# Patient Record
Sex: FEMALE | Race: BLACK OR AFRICAN AMERICAN | URBAN - METROPOLITAN AREA
[De-identification: names, ages, dates, MRNs, and addresses within clinical notes are randomized per-mention and may not be internally consistent; named-entity substitution may affect disease eponyms.]

---

## 2017-01-24 DIAGNOSIS — K21.9 GASTROESOPHAGEAL REFLUX DISEASE WITHOUT ESOPHAGITIS: ICD-10-CM

## 2017-01-25 RX ORDER — OMEPRAZOLE 40 MG/1
CAPSULE, DELAYED RELEASE ORAL
Qty: 90 CAP | Refills: 1 | Status: SHIPPED | OUTPATIENT
Start: 2017-01-25 | End: 2017-09-27 | Stop reason: SDUPTHER

## 2017-01-25 NOTE — TELEPHONE ENCOUNTER
Her [facility] records show this is discontinued. Can we verify again what she is on and if she is on anything for her stomach.

## 2017-01-25 NOTE — TELEPHONE ENCOUNTER
Her daughter returned called and states that she is back on medication the hospital put her back on it and gabapentin

## 2017-02-24 RX ORDER — NIFEDIPINE 90 MG/1
90 TABLET, FILM COATED, EXTENDED RELEASE ORAL DAILY
Qty: 90 TAB | Refills: 1 | Status: SHIPPED | OUTPATIENT
Start: 2017-02-24 | End: 2017-09-05 | Stop reason: SDUPTHER

## 2017-03-22 DIAGNOSIS — J45.30 MILD PERSISTENT ASTHMA WITHOUT COMPLICATION: ICD-10-CM

## 2017-03-24 RX ORDER — ALBUTEROL SULFATE 90 UG/1
2 AEROSOL, METERED RESPIRATORY (INHALATION)
Qty: 3 INHALER | Refills: 1 | Status: SHIPPED | OUTPATIENT
Start: 2017-03-24 | End: 2019-09-22

## 2017-05-02 ENCOUNTER — PATIENT OUTREACH (OUTPATIENT)
Dept: INTERNAL MEDICINE CLINIC | Age: 82
End: 2017-05-02

## 2017-05-02 NOTE — PROGRESS NOTES
Attempted to contact [facility] and [facility] for [place].  confirmed that Mrs. Nayla Bey is a current inpatient but there is not a nurse available at this time to conduct med-rec. Will attempt another call tomorrow.

## 2017-05-03 ENCOUNTER — PATIENT OUTREACH (OUTPATIENT)
Dept: INTERNAL MEDICINE CLINIC | Age: 82
End: 2017-05-03

## 2017-05-03 RX ORDER — ACETAMINOPHEN 500 MG
TABLET ORAL
COMMUNITY

## 2017-05-03 RX ORDER — CEPHALEXIN 500 MG/1
500 CAPSULE ORAL 2 TIMES DAILY
COMMUNITY
Start: 2017-05-02 | End: 2017-05-06

## 2017-05-03 RX ORDER — INSULIN GLARGINE 100 [IU]/ML
4 INJECTION, SOLUTION SUBCUTANEOUS DAILY
COMMUNITY

## 2017-05-03 RX ORDER — OXYCODONE HYDROCHLORIDE 5 MG/1
2.5 TABLET ORAL
COMMUNITY
End: 2017-09-27

## 2017-05-03 NOTE — PROGRESS NOTES
[address] [facility] Follow Up for [facility] Admission from 4/25/17 - 5/1/2017 for UTI, CKD. RRAT score: 28. High    Medical History:     Past Medical History:   Diagnosis Date    Asthma     Hypertension          This represents Transitions of Care b/c NN spoke with patient and/or caregiver within 2 business days of discharge. Mrs. Tremayne Mccloud is currently inpatient at [facility] and [address] for an unknown duration. Med-rec completed with staff nurse at that facility. Duration of stay unknown at this time. Course of current Hospitalization (referenced by [facility] EMR discharge summaries note dated 5/1/17):   ----------------------------------------------------------------------  \"Discharge Diagnosis:  UTI  - C&S + for E.coli resistant to flouroquinolones  - empiric Levaquin to cefazolin.  Known PCN allergy of hives, monitor for reaction, tolerating.     - cefazolin to keflex at [facility] through 5.6.17. Hypokalemia  - replete  - trend labs  - resolved    CKD 4  - trend labs  - near baseline  - stopped tramadol 2/2 renal function and Cr improved to 1.7 at [facility].  Use APAP and low dose narcotics if needed.      General weakness/functional decline  - seen by PT and will need SNF at [facility]    Left knee pain  - 3 view xray shows no acute osteo abnormality   - pain control    HTN    - nifedipine, coreg, imdur    Acute on chronic renal failure  - Improved with IV fluids and treatment of underlying infection  - trend labs  - at baseline of CKD4    COPD  - continue home meds/supportive care  - advair, HHN    Chronic diastolic heart failure  - Continue home lasix    Chronic anemia  - iron profile with normocytic anemia. - trend stable, no overt signs of bleeding.      Constipation  - cont bowel protocol  - added dulcolax supp.     DVT ppx  - heparin     Hospital Course:  Patient is an 81 yo female with pmhx of HTN, COPD, diastolic HF presents to ED from home with gen weakness found to have UTI.  UA C&S with E.coli resistant to flouroquinolones.  levaquin [facility], she [facility] course 5.6.17.  Patient with gen weakness from deconditioning and UTI and was seen by PT.  [facility] and patient has agreed to SNF.  She was noted to have left knee pain which was POA, xray showed no acute abnormality.  Cont pain control and PT.  Her other chronic comorbidities were monitored while admitted and she remained at or near baseline.  She is now stable for DC to SNF and to follow up with her family doctor in 1 week of DC.    ----------------------------------------------------------------------   Medication Reconciliation completed: yes. New medications at discharge include:  START taking these medications      Details    acetaminophen (TYLENOL) 500 mg PO TABS  Take 1 Tab by Mouth Every 6 Hours As Needed for Pain or Fever.        oxyCODONE (ROXICODONE) 5 mg PO TABS  Take 0.5 Tabs by Mouth Every 8 Hours As Needed for Pain. Qty: 5 Tab, Refills: 0        cephALEXin (KEFLEX) 500 mg PO CAPS  Take 1 Cap by Mouth Twice Daily for 5 days.            CONTINUE these medications which have CHANGED      Details    insulin LISPRO (HUMALOG VIAL) 100 unit/mL SC SOLN  Inject 1-6 Units beneath the skin 3 (three) times daily before meals.               STOP taking these medications          traMADol (ULTRAM) 50 mg PO TABS  Comments:    Reason for Stopping:  renal function                 Prescription Medication total: 14. (pharmacy consult for polypharm needed?) not at this time but may be necessary for polypharmacy in the future.

## 2017-05-09 ENCOUNTER — PATIENT OUTREACH (OUTPATIENT)
Dept: INTERNAL MEDICINE CLINIC | Age: 82
End: 2017-05-09

## 2017-05-09 NOTE — PROGRESS NOTES
[address] [facility] and [address] for status update. Mrs. Fabian Fulton is currently an inpatient in this facility. Spoke with staff nurse who states that family is insisting that Mrs. Gonzales return to [facility].  She is likely to be discharged from [facility] and [address] to [facility] today. Will follow [facility] for disposition.

## 2017-05-11 ENCOUNTER — PATIENT OUTREACH (OUTPATIENT)
Dept: INTERNAL MEDICINE CLINIC | Age: 82
End: 2017-05-11

## 2017-05-11 NOTE — PROGRESS NOTES
Patient was sent to ED from [facility] and [address] on 5/9/17 along with her  who was also at the same facility. Mrs. Gonzales was not directly admitted but was held in the ED overnight as Mrs. Nasim Pleitez, her daughter, did not want her to return to [facility] and [address]. She was held overnight pending acceptance at another SNF. When it appeared that Mrs. Ortega Louie would need to stay a second night awaiting a PT evaluation and referral to SNF, Mrs. Nasim Pleitez decided to take her home instead. At this time, Mr. Ortega Louie is hospitalized and Mrs. Nasim Pleitez would be able to take care of her mother. Attempted to contact Mrs. Nasim Pleitez for follow up. Her number rang to a recording stating that the person being called could not take the call- and there was no voice mail option. Will attempt another call tomorrow.

## 2017-05-12 ENCOUNTER — PATIENT OUTREACH (OUTPATIENT)
Dept: INTERNAL MEDICINE CLINIC | Age: 82
End: 2017-05-12

## 2017-05-12 NOTE — PROGRESS NOTES
Patient was sent to ED from [facility] [address] on 5/9/17 along with her  who was also at the same facility. Mrs. Gonzales was not directly admitted but was held in the ED overnight as Mrs. Wilfrid Mcrae, her daughter, did not want her to return to [facility] [address]. She was held overnight pending acceptance at another SNF. When it appeared that Mrs. Misty Cronin would need to stay a second night awaiting a PT evaluation and referral to SNF, Mrs. Wilfrid Mcrae decided to take her home instead. At this time, Mr. Misty Cronin is hospitalized and Mrs. Wilfrid Mcrae would be able to take care of her mother. Second attempt to contact Mrs. Wilfrid Mcrae for follow up. Her number rang to a recording stating that the person being called could not take the call- and there was no voice mail option.

## 2017-06-09 ENCOUNTER — PATIENT OUTREACH (OUTPATIENT)
Dept: FAMILY MEDICINE CLINIC | Age: 82
End: 2017-06-09

## 2017-06-09 NOTE — Clinical Note
FYI: I am  Covering today . This patient's daughter ( Mrs Hetal Lauren )called asking for this patient's current medication list for [facility]. Patient has not been seen since 5/19/2016. I sent last office note with those medications . They may not be accurate because patient has been hospitalized x 2 since that office visit.  Thanks

## 2017-06-09 NOTE — PROGRESS NOTES
Daughter Mrs Thomas Javier called asking for NN to have Dr Erin Rojas office fax over  to [facility] her mother's updated medication list. Last office visit notes were from 5/19/2016. The medication list may not be accurate because patient has had 2 hospital admissions since then with a stay in a SNF. Did send the list with the office note to [facility] Fax # [phone] as directed. .  Daughter called back again asking for current HGBA1C last one done in [facility] [place] [address] again in 5/2016. State's she will call Emily at the endocrinology office.

## 2017-07-26 DIAGNOSIS — I10 ESSENTIAL HYPERTENSION, BENIGN: ICD-10-CM

## 2017-07-26 RX ORDER — ISOSORBIDE MONONITRATE 30 MG/1
30 TABLET, EXTENDED RELEASE ORAL DAILY
Qty: 90 TAB | Refills: 1 | Status: SHIPPED | OUTPATIENT
Start: 2017-07-26 | End: 2018-08-31 | Stop reason: SDUPTHER

## 2017-09-27 ENCOUNTER — OFFICE VISIT (OUTPATIENT)
Dept: INTERNAL MEDICINE CLINIC | Age: 82
End: 2017-09-27

## 2017-09-27 VITALS
OXYGEN SATURATION: 97 % | HEIGHT: 59 IN | BODY MASS INDEX: 26 KG/M2 | DIASTOLIC BLOOD PRESSURE: 70 MMHG | HEART RATE: 68 BPM | RESPIRATION RATE: 16 BRPM | TEMPERATURE: 98.6 F | SYSTOLIC BLOOD PRESSURE: 150 MMHG | WEIGHT: 129 LBS

## 2017-09-27 DIAGNOSIS — N18.30 CHRONIC KIDNEY DISEASE (CKD), STAGE III (MODERATE) (HCC): ICD-10-CM

## 2017-09-27 DIAGNOSIS — K21.9 GASTROESOPHAGEAL REFLUX DISEASE WITHOUT ESOPHAGITIS: ICD-10-CM

## 2017-09-27 DIAGNOSIS — G62.9 NEUROPATHY: ICD-10-CM

## 2017-09-27 DIAGNOSIS — Z23 ENCOUNTER FOR IMMUNIZATION: ICD-10-CM

## 2017-09-27 DIAGNOSIS — D64.9 ANEMIA, UNSPECIFIED TYPE: ICD-10-CM

## 2017-09-27 DIAGNOSIS — Z00.00 MEDICARE ANNUAL WELLNESS VISIT, SUBSEQUENT: ICD-10-CM

## 2017-09-27 DIAGNOSIS — J45.20 MILD INTERMITTENT ASTHMA WITHOUT COMPLICATION: ICD-10-CM

## 2017-09-27 DIAGNOSIS — E11.10 TYPE 2 DIABETES MELLITUS WITH KETOACIDOSIS WITHOUT COMA, WITH LONG-TERM CURRENT USE OF INSULIN (HCC): Primary | ICD-10-CM

## 2017-09-27 DIAGNOSIS — Z79.4 TYPE 2 DIABETES MELLITUS WITH KETOACIDOSIS WITHOUT COMA, WITH LONG-TERM CURRENT USE OF INSULIN (HCC): Primary | ICD-10-CM

## 2017-09-27 RX ORDER — IPRATROPIUM BROMIDE AND ALBUTEROL SULFATE 2.5; .5 MG/3ML; MG/3ML
3 SOLUTION RESPIRATORY (INHALATION) 4 TIMES DAILY
Qty: 30 NEBULE | Refills: 3 | Status: SHIPPED | OUTPATIENT
Start: 2017-09-27 | End: 2019-11-19 | Stop reason: SDUPTHER

## 2017-09-27 RX ORDER — OMEPRAZOLE 40 MG/1
CAPSULE, DELAYED RELEASE ORAL
Qty: 90 CAP | Refills: 1 | Status: SHIPPED | OUTPATIENT
Start: 2017-09-27 | End: 2018-06-30 | Stop reason: SDUPTHER

## 2017-09-27 RX ORDER — GABAPENTIN 100 MG/1
CAPSULE ORAL
Qty: 90 CAP | Refills: 1 | Status: SHIPPED | OUTPATIENT
Start: 2017-09-27 | End: 2018-04-03 | Stop reason: SDUPTHER

## 2017-09-27 RX ORDER — NIFEDIPINE 90 MG/1
TABLET, FILM COATED, EXTENDED RELEASE ORAL
Qty: 90 TAB | Refills: 1 | Status: SHIPPED | OUTPATIENT
Start: 2017-09-27 | End: 2018-06-29 | Stop reason: SDUPTHER

## 2017-09-27 NOTE — PROGRESS NOTES
Chief Complaint   Patient presents with    Annual Wellness Visit    Diabetes    Hypertension     1. Have you been to the ER, urgent care clinic since your last visit? Hospitalized since your last visit? Yes Where: [facility]    2. Have you seen or consulted any other health care providers outside of the [address] [place] [address] Jeremy since your last visit? Include any pap smears or colon screening.  Yes Where: endo and cardiology    ADL Assessment 9/27/2017   Feeding yourself Help Needed   Getting from bed to chair Help Needed   Getting dressed No Help Needed   Bathing or showering Help Needed   Walk across the room (includes cane/walker) Help Needed   Using the telphone No Help Needed   Taking your medications Help Needed   Preparing meals Help Needed   Managing money (expenses/bills) Help Needed   Moderately strenuous housework (laundry) Help Needed   Shopping for personal items (toiletries/medicines) Help Needed   Shopping for groceries Help Needed   Driving Help Needed   Climbing a flight of stairs Help Needed   Getting to places beyond walking distances Help Needed     Lab slip printed and given to pt to get labs done at lab

## 2017-09-27 NOTE — PROGRESS NOTES
HISTORY OF PRESENT ILLNESS  Chery Wilder is a 80 y.o. female. HPI Ms. Trina Tucker is here for follow up on Diabetes and medication. She has been hospitalized several times for reasons from HTN, blood sugar issues, hypothermia and a UTI. Her  passed away several months ago. She has not lately seen cardiology, nephrology and endocrinology. Advised her and her daughter that it is important to follow up with all the specialists due to serous underlying health issues. Her daughter states she has actually been doing well since she got out of the hospital several months ago. When going through her medications, she is taking lasix but not potassium. She states she thinks the hospital took her off it. Will see how her potassium is when she gets her labs done. They were unable to get blood here in the office. Review of Systems   Constitutional: Positive for chills (c/o feeling cold all the time - was anemic when in the hospital) and malaise/fatigue. Respiratory: Negative for cough and shortness of breath. Cardiovascular: Positive for leg swelling (mild). Negative for chest pain. Gastrointestinal: Negative for heartburn (takes prilosec), nausea and vomiting. Neurological: Negative for dizziness. Physical Exam   Constitutional: She is oriented to person, place, and time. She appears well-developed and well-nourished. No distress. HENT:   Head: Normocephalic and atraumatic. Cardiovascular: Normal rate and regular rhythm. Pulmonary/Chest: Effort normal.   Musculoskeletal: She exhibits edema (+1). Neurological: She is alert and oriented to person, place, and time.      Visit Vitals    /70 (BP 1 Location: Left arm, BP Patient Position: Sitting)    Pulse 68    Temp 98.6 °F (37 °C) (Oral)    Resp 16    Ht 4' 11\" (1.499 m)    Wt 129 lb (58.5 kg)    SpO2 97%    BMI 26.05 kg/m2      Wt Readings from Last 3 Encounters:   09/27/17 129 lb (58.5 kg)   05/19/16 156 lb (70.8 kg)   05/04/16 156 lb (70.8 kg)     Diabetic foot exam:     Left: Filament test reduced sensation with micro filament   Pulse DP: 1+ (weak)   Pulse PT: 1+ (weak)   Deformities: None  Right: Filament test reduced sensation with micro filament   Pulse DP: 1+ (weak)   Pulse PT: 1+ (weak)   Deformities: None          ASSESSMENT and PLAN    ICD-10-CM ICD-9-CM    1. Type 2 diabetes mellitus with ketoacidosis without coma, with long-term current use of insulin (HCC) E13.10 250.10 LA COLLECTION VENOUS BLOOD,VENIPUNCTURE    X11.6 L17.58 METABOLIC PANEL, COMPREHENSIVE      LIPID PANEL      HEMOGLOBIN A1C WITH EAG      MICROALBUMIN, UR, RAND W/ MICROALBUMIN/CREA RATIO       DIABETES FOOT EXAM      TSH 3RD GENERATION   2. Neuropathy (HCC) G62.9 355.9 gabapentin (NEURONTIN) 100 mg capsule   3. Chronic kidney disease (CKD), stage III (moderate) N18.3 585.3    4. Anemia, unspecified type D64.9 285.9 CBC WITH AUTOMATED DIFF      IRON PROFILE   5. Mild intermittent asthma without complication I06.55 658.47 albuterol-ipratropium (DUO-NEB) 2.5 mg-0.5 mg/3 ml nebu   6. Gastroesophageal reflux disease without esophagitis K21.9 530.81 omeprazole (PRILOSEC) 40 mg capsule     Pt verbalized understanding of their condition and diagnoses, treatment plan,  as well as side effects of any new medications prescribed.

## 2017-09-27 NOTE — PROGRESS NOTES
Juan Chong is a 80 y.o. female and presents for annual Medicare Wellness Visit. Problem List: Reviewed with patient and discussed risk factors. Patient Active Problem List   Diagnosis Code    Essential hypertension, benign I10    Asthma J45.909    Diabetes mellitus ([facility] [address] E11.9    Chronic kidney disease (CKD), stage III (moderate) N18.3    Advanced care planning/counseling discussion L92.16    Diastolic heart failure (HCC) I50.30       Current medical providers:  Patient Care Team:  David Ruggiero PA-C as PCP - General (Family Practice)  Meenu Beltran RN as Ambulatory Care Navigator    PSH: Reviewed with patient  Past Surgical History:   Procedure Laterality Date    HX HYSTERECTOMY      HX TUMOR REMOVAL      arm        SH: Reviewed with patient  Social History   Substance Use Topics    Smoking status: Never Smoker    Smokeless tobacco: Never Used    Alcohol use No       FH: Reviewed with patient  Family History   Problem Relation Age of Onset    Stroke Father     Diabetes Maternal Grandmother     Asthma Maternal Grandmother        Medications/Allergies: Reviewed with patient  Current Outpatient Prescriptions on File Prior to Visit   Medication Sig Dispense Refill    isosorbide mononitrate ER (IMDUR) 30 mg tablet Take 1 Tab by mouth daily. 90 Tab 1    acetaminophen (TYLENOL) 500 mg tablet Take  by mouth every six (6) hours as needed for Pain.  insulin glargine (LANTUS SOLOSTAR) 100 unit/mL (3 mL) pen 4 Units by SubCUTAneous route daily.  albuterol (PROVENTIL HFA, VENTOLIN HFA, PROAIR HFA) 90 mcg/actuation inhaler Take 2 Puffs by inhalation every four (4) hours as needed for Wheezing. 3 Inhaler 1    insulin aspart (NOVOLOG) 100 unit/mL inpn by SubCUTAneous route. Sliding scale per endocrinology orders   Indications: type 2 diabetes mellitus      docusate sodium (COLACE) 100 mg capsule Take 1 Cap by mouth daily.  90 Cap 1    polyethylene glycol (LAXA CLEAR) 17 gram/dose powder Take 17 g by mouth daily. 510 g 3    ferrous sulfate 325 mg (65 mg iron) tablet Take 1 Tab by mouth Daily (before breakfast). 90 Tab 1    ergocalciferol (ERGOCALCIFEROL) 50,000 unit capsule Take 1 Cap by mouth every seven (7) days. Take on Sunday 12 Cap 1    cloNIDine (CATAPRES) 0.3 mg/24 hr apply 1 patch every week 12 Patch 1    hydrALAZINE (APRESOLINE) 100 mg tablet Take 1 Tab by mouth three (3) times daily. (Patient taking differently: Take 100 mg by mouth two (2) times a day.) 270 Tab 1    furosemide (LASIX) 40 mg tablet take 1 tablet by mouth daily (Patient taking differently: take 1/2 in the morning, and 1/2 in the afternoon) 90 Tab 1    carvedilol (COREG) 12.5 mg tablet Take 1 Tab by mouth two (2) times daily (with meals). (Patient taking differently: Take 25 mg by mouth two (2) times daily (with meals). ) 180 Tab 1    Insulin Needles, Disposable, (BD INSULIN PEN NEEDLE UF SHORT) 31 gauge x 5/16\" ndle Use for bid insulin injections 1 Package 11    Insulin Syringes, Disposable, 1 mL syrg Use for bid insulin administration 100 Syringe prn    ONETOUCH ULTRA TEST strip       multivitamin (ONE A DAY) tablet Take 1 Tab by mouth daily. 90 Each 1    Nebulizer & Compressor machine Use with albuterol solution for chest congestion 1 Each 0    Walker misc Pneumonia, weakness 1 Each 0    aspirin delayed-release 81 mg tablet Take  by mouth daily.  ipratropium (ATROVENT) 0.02 % nebulizer solution 2.5 mL by Nebulization route as needed for Wheezing. 300 mL 5    insulin NPH/insulin regular (NOVOLIN 70/30, HUMULIN 70/30) 100 unit/mL (70-30) injection Inject 25 units in the morning beneath the skin. (Patient taking differently: 10 Units by SubCUTAneous route two (2) times a day. Inject 25 units in the morning beneath the skin.) 10 mL 3     No current facility-administered medications on file prior to visit.        Allergies   Allergen Reactions    Ace Inhibitors Angioedema    Pcn [Penicillins] Other (comments)       Objective:  Visit Vitals    /70 (BP 1 Location: Left arm, BP Patient Position: Sitting)    Pulse 68    Temp 98.6 °F (37 °C) (Oral)    Resp 16    Ht 4' 11\" (1.499 m)    Wt 129 lb (58.5 kg)    SpO2 97%    BMI 26.05 kg/m2    Body mass index is 26.05 kg/(m^2). Assessment of cognitive impairment: Alert and oriented x 3    Depression Screen:   PHQ over the last two weeks 9/27/2017   Little interest or pleasure in doing things Not at all   Feeling down, depressed or hopeless Not at all   Total Score PHQ 2 0       Fall Risk Assessment:    Fall Risk Assessment, last 12 mths 9/27/2017   Able to walk? Yes   Fall in past 12 months? No   Fall with injury? -   Number of falls in past 12 months -   Fall Risk Score -       Functional Ability:   Does the patient exhibit a steady gait? Yes - with a walker/cane   How long did it take the patient to get up and walk from a sitting position? 10-20 seconds   Is the patient self reliant?  (ie can do own laundry, meals, household chores)  no     Does the patient handle his/her own medications?  no     Does the patient handle his/her own money? no     Is the patients home safe (ie good lighting, handrails on stairs and bath, etc.)? yes     Did you notice or did patient express any hearing difficulties? no     Did you notice or did patient express any vision difficulties?   declined     Were distance and reading eye charts used? N/A       Advance Care Planning:   Patient was offered the opportunity to discuss advance care planning:  yes     Does patient have an Advance Directive:  Yes. Pts daughter states she has one   If no, did you provide information on Caring Connections?   No - pt reports having one       Plan:      Orders Placed This Encounter    INFLUENZA VIRUS VACCINE, HIGH DOSE SEASONAL, PRESERVATIVE FREE    METABOLIC PANEL, COMPREHENSIVE    LIPID PANEL    HEMOGLOBIN A1C WITH EAG    MICROALBUMIN, UR, RAND W/ MICROALBUMIN/CREA RATIO    TSH 3RD GENERATION    CBC WITH AUTOMATED DIFF    IRON PROFILE    HM DIABETES FOOT EXAM    OK COLLECTION VENOUS BLOOD,VENIPUNCTURE    gabapentin (NEURONTIN) 100 mg capsule    albuterol-ipratropium (DUO-NEB) 2.5 mg-0.5 mg/3 ml nebu    NIFEdipine ER (ADALAT CC) 90 mg ER tablet    omeprazole (PRILOSEC) 40 mg capsule       Health Maintenance   Topic Date Due    MICROALBUMIN Q1  03/10/2016    LIPID PANEL Q1  03/19/2016    HEMOGLOBIN A1C Q6M  06/30/2016    EYE EXAM RETINAL OR DILATED Q1  06/09/2018    FOOT EXAM Q1  09/27/2018    MEDICARE YEARLY EXAM  09/28/2018    GLAUCOMA SCREENING Q2Y  06/09/2019    Pneumococcal 65+ Low/Medium Risk (2 of 2 - PPSV23) 09/09/2020    DTaP/Tdap/Td series (2 - Td) 05/19/2026    OSTEOPOROSIS SCREENING (DEXA)  Completed    ZOSTER VACCINE AGE 60>  Addressed    INFLUENZA AGE 9 TO ADULT  Addressed       *Patient verbalized understanding and agreement with the plan. A copy of the After Visit Summary with personalized health plan was given to the patient today.

## 2017-09-27 NOTE — MR AVS SNAPSHOT
Visit Information Date & Time Provider Department Dept. Phone Encounter #  
 9/27/2017 10:00 AM Felicity Méndez PA-C [facility] [phone] Follow-up Instructions Return in about 6 months (around 3/27/2018) for Combo. Upcoming Health Maintenance Date Due MICROALBUMIN Q1 3/10/2016 LIPID PANEL Q1 3/19/2016 HEMOGLOBIN A1C Q6M 6/30/2016 EYE EXAM RETINAL OR DILATED Q1 6/9/2018 FOOT EXAM Q1 9/27/2018 MEDICARE YEARLY EXAM 9/28/2018 GLAUCOMA SCREENING Q2Y 6/9/2019 Pneumococcal 65+ Low/Medium Risk (2 of 2 - PPSV23) 9/9/2020 DTaP/Tdap/Td series (2 - Td) 5/19/2026 Allergies as of 9/27/2017  Review Complete On: 9/27/2017 By: Felicity Méndez PA-C Severity Noted Reaction Type Reactions Ace Inhibitors High 01/12/2015    Angioedema Pcn [Penicillins]  10/04/2012    Other (comments) Current Immunizations  Never Reviewed Name Date Influenza High Dose Vaccine PF 9/27/2017 Pneumococcal Vaccine (Unspecified Type) 9/9/2015 Not reviewed this visit You Were Diagnosed With   
  
 Codes Comments Type 2 diabetes mellitus with ketoacidosis without coma, with long-term current use of insulin ([facility]    -  Primary ICD-10-CM: E13.10, Z79.4 ICD-9-CM: 250.10, V58.67 Neuropathy ([facility]     ICD-10-CM: G62.9 ICD-9-CM: 097. 9 Chronic kidney disease (CKD), stage III (moderate)     ICD-10-CM: N18.3 ICD-9-CM: 734. 3 Anemia, unspecified type     ICD-10-CM: D64.9 ICD-9-CM: 285.9 Mild intermittent asthma without complication     ORQ-72-DU: J45.20 ICD-9-CM: 493.90 Gastroesophageal reflux disease without esophagitis     ICD-10-CM: K21.9 ICD-9-CM: 530.81 Encounter for immunization     ICD-10-CM: G86 ICD-9-CM: V03.89 Medicare annual wellness visit, subsequent     ICD-10-CM: Z00.00 ICD-9-CM: V70.0 Vitals BP Pulse Temp Resp Height(growth percentile) Weight(growth percentile) 150/70 (BP 1 Location: Left arm, BP Patient Position: Sitting) 68 98.6 °F (37 °C) (Oral) 16 4' 11\" (1.499 m) 129 lb (58.5 kg) SpO2 BMI OB Status Smoking Status 97% 26.05 kg/m2 Hysterectomy Never Smoker Vitals History BMI and BSA Data Body Mass Index Body Surface Area 26.05 kg/m 2 1.56 m 2 Preferred Pharmacy Pharmacy Name Phone [phone] [facility] [phone] Your Updated Medication List  
  
   
This list is accurate as of: 9/27/17 11:59 PM.  Always use your most recent med list.  
  
  
  
  
 acetaminophen 500 mg tablet Commonly known as:  TYLENOL Take  by mouth every six (6) hours as needed for Pain. albuterol 90 mcg/actuation inhaler Commonly known as:  PROVENTIL HFA, VENTOLIN HFA, PROAIR HFA Take 2 Puffs by inhalation every four (4) hours as needed for Wheezing. albuterol-ipratropium 2.5 mg-0.5 mg/3 ml Nebu Commonly known as:  DUO-NEB  
3 mL by Nebulization route four (4) times daily. aspirin delayed-release 81 mg tablet Take  by mouth daily. carvedilol 12.5 mg tablet Commonly known as:  Maybelle Pallas Take 1 Tab by mouth two (2) times daily (with meals). cloNIDine 0.3 mg/24 hr  
Commonly known as:  CATAPRES  
apply 1 patch every week  
  
 docusate sodium 100 mg capsule Commonly known as:  Grey Guppy Take 1 Cap by mouth daily. ergocalciferol 50,000 unit capsule Commonly known as:  ERGOCALCIFEROL Take 1 Cap by mouth every seven (7) days. Take on Sunday  
  
 ferrous sulfate 325 mg (65 mg iron) tablet Take 1 Tab by mouth Daily (before breakfast). furosemide 40 mg tablet Commonly known as:  LASIX  
take 1 tablet by mouth daily  
  
 gabapentin 100 mg capsule Commonly known as:  NEURONTIN  
take 1 capsule by mouth at bedtime  
  
 hydrALAZINE 100 mg tablet Commonly known as:  APRESOLINE Take 1 Tab by mouth three (3) times daily. insulin aspart 100 unit/mL Inpn Commonly known as:  NOVOLOG  
by SubCUTAneous route. Sliding scale per endocrinology orders   Indications: type 2 diabetes mellitus  
  
 insulin glargine 100 unit/mL (3 mL) Inpn Commonly known as:  JEANA ROBLEDO  
4 Units by SubCUTAneous route daily. Insulin Needles (Disposable) 31 gauge x 5/16\" Ndle Commonly known as:  BD INSULIN PEN NEEDLE UF SHORT Use for bid insulin injections  
  
 insulin NPH/insulin regular 100 unit/mL (70-30) injection Commonly known as:  NOVOLIN 70/30, HUMULIN 70/30 Inject 25 units in the morning beneath the skin. Insulin Syringes (Disposable) 1 mL Syrg Use for bid insulin administration  
  
 ipratropium 0.02 % Soln Commonly known as:  ATROVENT  
2.5 mL by Nebulization route as needed for Wheezing. isosorbide mononitrate ER 30 mg tablet Commonly known as:  IMDUR Take 1 Tab by mouth daily. multivitamin tablet Commonly known as:  ONE A DAY Take 1 Tab by mouth daily. Nebulizer & Compressor machine Use with albuterol solution for chest congestion NIFEdipine ER 90 mg ER tablet Commonly known as:  ADALAT CC  
take 1 tablet by mouth once daily  
  
 omeprazole 40 mg capsule Commonly known as:  PRILOSEC  
take 1 capsule by mouth once daily ONETOUCH ULTRA TEST strip Generic drug:  glucose blood VI test strips  
  
 polyethylene glycol 17 gram/dose powder Commonly known as:  Emogene Gentleman Take 17 g by mouth daily. Cramen Cerda Pneumonia, weakness Prescriptions Sent to Pharmacy Refills  
 gabapentin (NEURONTIN) 100 mg capsule 1 Sig: take 1 capsule by mouth at bedtime Class: Normal  
 Pharmacy: [facility] Ph #: [phone]  
 albuterol-ipratropium (DUO-NEB) 2.5 mg-0.5 mg/3 ml nebu 3 Sig: 3 mL by Nebulization route four (4) times daily.   
 Class: Normal  
 Pharmacy: [place] [id number] [facility], [phone] [address] Ph #: [phone] Route: Nebulization NIFEdipine ER (ADALAT CC) 90 mg ER tablet 1 Sig: take 1 tablet by mouth once daily Class: Normal  
 [facility] Ph #: [phone]  
 omeprazole (PRILOSEC) 40 mg capsule 1 Sig: take 1 capsule by mouth once daily Class: Normal  
 [facility], [phone] Ph #: [phone] We Performed the Following  DIABETES FOOT EXAM [7 Custom] INFLUENZA VIRUS VACCINE, HIGH DOSE SEASONAL, PRESERVATIVE FREE [88678 CPT(R)] RI COLLECTION VENOUS BLOOD,VENIPUNCTURE [id number] CPT(R)] Follow-up Instructions Return in about 6 months (around 3/27/2018) for Combo. Introducing [facility]! Beto Day introduces [employer] patient portal. Now you can access parts of your medical record, email your doctor's office, and request medication refills online. 1. In your internet browser, go to [email or web address] 2. Click on the First Time User? Click Here link in the Sign In box. You will see the New Member Sign Up page. 3. Enter your [employer] Access Code exactly as it appears below. You will not need to use this code after youve completed the sign-up process. If you do not sign up before the expiration date, you must request a new code. · [employer] Access Code: [email or web address] Expires: 12/26/2017 12:49 PM 
 
4. Enter the last four digits of your Social Security Number (xxxx) and Date of Birth (mm/dd/yyyy) as indicated and click Submit. You will be taken to the next sign-up page. 5. Create a [employer] ID. This will be your [employer] login ID and cannot be changed, so think of one that is secure and easy to remember. 6. Create a ShutlharSnapAppointments password. You can change your password at any time. 7. Enter your Password Reset Question and Answer. This can be used at a later time if you forget your password. 8. Enter your e-mail address. You will receive e-mail notification when new information is available in [address] 9. Click Sign Up. You can now view and download portions of your medical record. 10. Click the Download Summary menu link to download a portable copy of your medical information. If you have questions, please visit the Frequently Asked Questions section of the [employer] website. Remember, [employer] is NOT to be used for urgent needs. For medical emergencies, dial 911. Now available from your iPhone and Android! Please provide this summary of care documentation to your next provider. Your primary care clinician is listed as Nguyen Castañeda. If you have any questions after today's visit, please call [phone].

## 2017-09-28 NOTE — PATIENT INSTRUCTIONS
Anemia: Care Instructions  Your Care Instructions    Anemia is a low level of red blood cells, which carry oxygen throughout your body. Many things can cause anemia. Lack of iron is one of the most common causes. Your body needs iron to make hemoglobin, a substance in red blood cells that carries oxygen from the lungs to your body's cells. Without enough iron, the body produces fewer and smaller red blood cells. As a result, your body's cells do not get enough oxygen, and you feel tired and weak. And you may have trouble concentrating. Bleeding is the most common cause of a lack of iron. You may have heavy menstrual bleeding or bleeding caused by conditions such as ulcers, hemorrhoids, or cancer. Regular use of aspirin or other anti-inflammatory medicines (such as ibuprofen) also can cause bleeding in some people. A lack of iron in your diet also can cause anemia, especially at times when the body needs more iron, such as during pregnancy, infancy, and the teen years. Your doctor may have prescribed iron pills. It may take several months of treatment for your iron levels to return to normal. Your doctor also may suggest that you eat foods that are rich in iron, such as meat and beans. There are many other causes of anemia. It is not always due to a lack of iron. Finding the specific cause of your anemia will help your doctor find the right treatment for you. Follow-up care is a key part of your treatment and safety. Be sure to make and go to all appointments, and call your doctor if you are having problems. It's also a good idea to know your test results and keep a list of the medicines you take. How can you care for yourself at home? · Take your medicines exactly as prescribed. Call your doctor if you think you are having a problem with your medicine. · If your doctor recommends iron pills, take them as directed:  ¨ Try to take the pills on an empty stomach about 1 hour before or 2 hours after meals. But you may need to take iron with food to avoid an upset stomach. ¨ Do not take antacids or drink milk or caffeine drinks (such as coffee, tea, or cola) at the same time or within 2 hours of the time that you take your iron. They can make it hard for your body to absorb the iron. ¨ Vitamin C (from food or supplements) helps your body absorb iron. Try taking iron pills with a glass of orange juice or some other food that is high in vitamin C, such as citrus fruits. ¨ Iron pills may cause stomach problems, such as heartburn, nausea, diarrhea, constipation, and cramps. Be sure to drink plenty of fluids, and include fruits, vegetables, and fiber in your diet each day. Iron pills often make your bowel movements dark or green. ¨ If you forget to take an iron pill, do not take a double dose of iron the next time you take a pill. ¨ Keep iron pills out of the reach of small children. An overdose of iron can be very dangerous. · Follow your doctor's advice about eating iron-rich foods. These include red meat, shellfish, poultry, eggs, beans, raisins, whole-grain bread, and leafy green vegetables. · Steam vegetables to help them keep their iron content. When should you call for help? Call 911 anytime you think you may need emergency care. For example, call if:  · You have symptoms of a heart attack. These may include:  ¨ Chest pain or pressure, or a strange feeling in the chest.  ¨ Sweating. ¨ Shortness of breath. ¨ Nausea or vomiting. ¨ Pain, pressure, or a strange feeling in the back, neck, jaw, or upper belly or in one or both shoulders or arms. ¨ Lightheadedness or sudden weakness. ¨ A fast or irregular heartbeat. After you call 911, the  may tell you to chew 1 adult-strength or 2 to 4 low-dose aspirin. Wait for an ambulance. Do not try to drive yourself. · You passed out (lost consciousness).   Call your doctor now or seek immediate medical care if:  · You have new or increased shortness of breath. · You are dizzy or lightheaded, or you feel like you may faint. · Your fatigue and weakness continue or get worse. · You have any abnormal bleeding, such as:  ¨ Nosebleeds. ¨ Vaginal bleeding that is different (heavier, more frequent, at a different time of the month) than what you are used to. ¨ Bloody or black stools, or rectal bleeding. ¨ Bloody or pink urine. Watch closely for changes in your health, and be sure to contact your doctor if:  · You do not get better as expected. Where can you learn more? Go to http://ramírez-jody.info/. Enter R301 in the search box to learn more about \"Anemia: Care Instructions. \"  Current as of: October 13, 2016  Content Version: 11.3  © 7778-2044 [employer]. Care instructions adapted under license by [employer] (which disclaims liability or warranty for this information). If you have questions about a medical condition or this instruction, always ask your healthcare professional. [place] [address] any warranty or liability for your use of this information.

## 2017-10-10 ENCOUNTER — TELEPHONE (OUTPATIENT)
Dept: INTERNAL MEDICINE CLINIC | Age: 82
End: 2017-10-10

## 2017-10-10 NOTE — TELEPHONE ENCOUNTER
Pharmacist from [facility] [address] ([phone]) called to see if he could switch her clonidine to Brand Name - Catapres. He said the Brand Name is $3.00 where the generic would run patient $37.00. Spoke with Carlitos Gallagher and she said that would be ok. Advised Pharmacist of this decision.

## 2018-01-09 DIAGNOSIS — E55.9 VITAMIN D DEFICIENCY: ICD-10-CM

## 2018-01-10 RX ORDER — LANOLIN ALCOHOL/MO/W.PET/CERES
CREAM (GRAM) TOPICAL
Qty: 90 TAB | Refills: 1 | Status: SHIPPED | OUTPATIENT
Start: 2018-01-10 | End: 2018-10-06 | Stop reason: SDUPTHER

## 2018-01-10 RX ORDER — ERGOCALCIFEROL 1.25 MG/1
CAPSULE ORAL
Qty: 12 CAP | Refills: 1 | Status: SHIPPED | OUTPATIENT
Start: 2018-01-10 | End: 2018-10-22 | Stop reason: SDUPTHER

## 2018-03-29 ENCOUNTER — OFFICE VISIT (OUTPATIENT)
Dept: INTERNAL MEDICINE CLINIC | Age: 83
End: 2018-03-29

## 2018-03-29 VITALS
BODY MASS INDEX: 26.41 KG/M2 | WEIGHT: 131 LBS | TEMPERATURE: 98.5 F | DIASTOLIC BLOOD PRESSURE: 63 MMHG | SYSTOLIC BLOOD PRESSURE: 137 MMHG | RESPIRATION RATE: 18 BRPM | HEART RATE: 65 BPM | HEIGHT: 59 IN | OXYGEN SATURATION: 98 %

## 2018-03-29 DIAGNOSIS — Z00.00 MEDICARE ANNUAL WELLNESS VISIT, SUBSEQUENT: ICD-10-CM

## 2018-03-29 DIAGNOSIS — N64.4 BREAST PAIN, LEFT: ICD-10-CM

## 2018-03-29 DIAGNOSIS — D64.9 ANEMIA, UNSPECIFIED TYPE: Primary | ICD-10-CM

## 2018-03-29 DIAGNOSIS — R92.0 MICROCALCIFICATIONS OF THE BREAST: ICD-10-CM

## 2018-03-29 DIAGNOSIS — I49.9 IRREGULAR HEART BEAT: ICD-10-CM

## 2018-03-29 PROBLEM — E11.40 TYPE 2 DIABETES MELLITUS WITH DIABETIC NEUROPATHY (HCC): Status: ACTIVE | Noted: 2018-03-29

## 2018-03-29 NOTE — PROGRESS NOTES
Kenyetta Love is a 80 y.o. female and presents for annual Medicare Wellness Visit. Problem List: Reviewed with patient and discussed risk factors. Patient Active Problem List   Diagnosis Code    Essential hypertension, benign I10    Asthma J45.909    Diabetes mellitus ([facility] [address] E11.9    Chronic kidney disease (CKD), stage III (moderate) N18.3    Advanced care planning/counseling discussion P45.47    Diastolic heart failure (HCC) I50.30    Type 2 diabetes mellitus with diabetic neuropathy (HCC) E11.40       Current medical providers:  Patient Care Team:  Amina Boone PA-C as PCP - General (Family Practice)  Aleah Hackett RN as Ambulatory Care Navigator    PSH: Reviewed with patient  Past Surgical History:   Procedure Laterality Date    HX HYSTERECTOMY      HX TUMOR REMOVAL      arm        SH: Reviewed with patient  Social History   Substance Use Topics    Smoking status: Never Smoker    Smokeless tobacco: Never Used    Alcohol use No       FH: Reviewed with patient  Family History   Problem Relation Age of Onset    Stroke Father     Diabetes Maternal Grandmother     Asthma Maternal Grandmother        Medications/Allergies: Reviewed with patient  Current Outpatient Prescriptions on File Prior to Visit   Medication Sig Dispense Refill    VITAMIN D2 50,000 unit capsule take 1 capsule by mouth every week ON SUNDAY 12 Cap 1    ferrous sulfate 325 mg (65 mg iron) tablet take 1 tablet by mouth once daily BEFORE BREAKFAST 90 Tab 1    gabapentin (NEURONTIN) 100 mg capsule take 1 capsule by mouth at bedtime 90 Cap 1    albuterol-ipratropium (DUO-NEB) 2.5 mg-0.5 mg/3 ml nebu 3 mL by Nebulization route four (4) times daily. 30 Nebule 3    NIFEdipine ER (ADALAT CC) 90 mg ER tablet take 1 tablet by mouth once daily 90 Tab 1    omeprazole (PRILOSEC) 40 mg capsule take 1 capsule by mouth once daily 90 Cap 1    isosorbide mononitrate ER (IMDUR) 30 mg tablet Take 1 Tab by mouth daily.  90 Tab 1    acetaminophen (TYLENOL) 500 mg tablet Take  by mouth every six (6) hours as needed for Pain.  insulin glargine (LANTUS SOLOSTAR) 100 unit/mL (3 mL) pen 4 Units by SubCUTAneous route daily.  albuterol (PROVENTIL HFA, VENTOLIN HFA, PROAIR HFA) 90 mcg/actuation inhaler Take 2 Puffs by inhalation every four (4) hours as needed for Wheezing. 3 Inhaler 1    insulin aspart (NOVOLOG) 100 unit/mL inpn by SubCUTAneous route. Sliding scale per endocrinology orders   Indications: type 2 diabetes mellitus      docusate sodium (COLACE) 100 mg capsule Take 1 Cap by mouth daily. 90 Cap 1    polyethylene glycol (LAXA CLEAR) 17 gram/dose powder Take 17 g by mouth daily. 510 g 3    cloNIDine (CATAPRES) 0.3 mg/24 hr apply 1 patch every week 12 Patch 1    hydrALAZINE (APRESOLINE) 100 mg tablet Take 1 Tab by mouth three (3) times daily. (Patient taking differently: Take 100 mg by mouth two (2) times a day.) 270 Tab 1    furosemide (LASIX) 40 mg tablet take 1 tablet by mouth daily (Patient taking differently: take 1/2 in the morning, and 1/2 in the afternoon) 90 Tab 1    carvedilol (COREG) 12.5 mg tablet Take 1 Tab by mouth two (2) times daily (with meals). (Patient taking differently: Take 25 mg by mouth two (2) times daily (with meals). ) 180 Tab 1    ipratropium (ATROVENT) 0.02 % nebulizer solution 2.5 mL by Nebulization route as needed for Wheezing. 300 mL 5    Insulin Needles, Disposable, (BD INSULIN PEN NEEDLE UF SHORT) 31 gauge x 5/16\" ndle Use for bid insulin injections 1 Package 11    Insulin Syringes, Disposable, 1 mL syrg Use for bid insulin administration 100 Syringe prn    ONETOUCH ULTRA TEST strip       multivitamin (ONE A DAY) tablet Take 1 Tab by mouth daily. 90 Each 1    Nebulizer & Compressor machine Use with albuterol solution for chest congestion 1 Each 0    Walker misc Pneumonia, weakness 1 Each 0    aspirin delayed-release 81 mg tablet Take  by mouth daily.         insulin NPH/insulin regular (NOVOLIN 70/30, HUMULIN 70/30) 100 unit/mL (70-30) injection Inject 25 units in the morning beneath the skin. (Patient taking differently: 10 Units by SubCUTAneous route two (2) times a day. Inject 25 units in the morning beneath the skin.) 10 mL 3     No current facility-administered medications on file prior to visit. Allergies   Allergen Reactions    Ace Inhibitors Angioedema    Pcn [Penicillins] Other (comments)       Objective:  Visit Vitals    /63 (BP 1 Location: Left arm, BP Patient Position: Sitting)    Pulse 65    Temp 98.5 °F (36.9 °C) (Oral)    Resp 18    Ht 4' 11\" (1.499 m)    Wt 131 lb (59.4 kg)    SpO2 98%    BMI 26.46 kg/m2    Body mass index is 26.46 kg/(m^2). Assessment of cognitive impairment: Alert and oriented x 3    Depression Screen:   PHQ over the last two weeks 3/29/2018   Little interest or pleasure in doing things Not at all   Feeling down, depressed or hopeless Not at all   Total Score PHQ 2 0       Fall Risk Assessment:    Fall Risk Assessment, last 12 mths 3/29/2018   Able to walk? Yes   Fall in past 12 months? No   Fall with injury? -   Number of falls in past 12 months -   Fall Risk Score -       Functional Ability:   Does the patient exhibit a steady gait? No - uses a walker   How long did it take the patient to get up and walk from a sitting position? 10-20 seconds   Is the patient self reliant?  (ie can do own laundry, meals, household chores)  no     Does the patient handle his/her own medications?  no     Does the patient handle his/her own money? no     Is the patients home safe (ie good lighting, handrails on stairs and bath, etc.)? yes     Did you notice or did patient express any hearing difficulties? no     Did you notice or did patient express any vision difficulties?   no     Were distance and reading eye charts used?   yes       Advance Care Planning:   Patient was offered the opportunity to discuss advance care planning:  yes     Does patient have an Advance Directive:  no   If no, did you provide information on Caring Connections? yes       Plan:      Orders Placed This Encounter    KYLE MAMMO BI DX INCL CAD    CBC WITH AUTOMATED DIFF    IRON PROFILE    VITAMIN B12    AMB POC EKG ROUTINE W/ 12 LEADS, INTER & REP       Health Maintenance   Topic Date Due    MICROALBUMIN Q1  03/10/2016    LIPID PANEL Q1  03/19/2016    HEMOGLOBIN A1C Q6M  06/30/2016    EYE EXAM RETINAL OR DILATED Q1  06/09/2018    FOOT EXAM Q1  09/27/2018    GLAUCOMA SCREENING Q2Y  06/09/2019    Pneumococcal 65+ Low/Medium Risk (2 of 2 - PPSV23) 09/09/2020    DTaP/Tdap/Td series (2 - Td) 05/19/2026    Bone Densitometry (Dexa) Screening  Completed    ZOSTER VACCINE AGE 60>  Addressed    Influenza Age 5 to Adult  Addressed       *Patient verbalized understanding and agreement with the plan. A copy of the After Visit Summary with personalized health plan was given to the patient today.

## 2018-03-29 NOTE — PATIENT INSTRUCTIONS
If you are not contacted by [facility] to schedule the mammogram, please call them to schedule. [phone] [id number] [phone]    Schedule of Personalized Health Plan  (Provide Copy to Patient)  The best way to stay healthy is to live a healthy lifestyle. A healthy lifestyle includes regular exercise, eating a well-balanced diet, keeping a healthy weight and not smoking. Regular physical exams and screening tests are another important way to take care of yourself. Preventive exams provided by health care providers can find health problems early when treatment works best and can keep you from getting certain diseases or illnesses. Preventive services include exams, lab tests, screenings, shots, monitoring and information to help you take care of your own health. All people over 65 should have a pneumonia shot. Pneumonia shots are usually only needed once in a lifetime unless your doctor decides differently. All people over 65 should have a yearly flu shot. People over 65 are at medium to high risk for Hepatitis B. Three shots are needed for complete protection. In addition to your physical exam, some screening tests are recommended:    Bone mass measurement (dexa scan) is recommended every two years  Diabetes Mellitus screening is recommended every year. Glaucoma is an eye disease caused by high pressure in the eye. An eye exam is recommended every year. Cardiovascular screening tests that check your cholesterol and other blood fat (lipid) levels are recommended every five years. Colorectal Cancer screening tests help to find pre-cancerous polyps (growths in the colon) so they can be removed before they turn into cancer. Tests ordered for screening depend on your personal and family history risk factors.     Screening for Breast Cancer is recommended yearly with a mammogram.    Screening for Cervical Cancer is recommended every two years (annually for certain risk factors, such as previous history of STD or abnormal PAP in past 7 years), with a Pelvic Exam with PAP    Here is a list of your current Health Maintenance items with a due date:  Health Maintenance   Topic Date Due    MICROALBUMIN Q1  03/10/2016    LIPID PANEL Q1  03/19/2016    HEMOGLOBIN A1C Q6M  06/30/2016    EYE EXAM RETINAL OR DILATED Q1  06/09/2018    FOOT EXAM Q1  09/27/2018    GLAUCOMA SCREENING Q2Y  06/09/2019    Pneumococcal 65+ Low/Medium Risk (2 of 2 - PPSV23) 09/09/2020    DTaP/Tdap/Td series (2 - Td) 05/19/2026    Bone Densitometry (Dexa) Screening  Completed    ZOSTER VACCINE AGE 60>  Addressed    Influenza Age 5 to Adult  Addressed

## 2018-03-29 NOTE — PROGRESS NOTES
HISTORY OF PRESENT ILLNESS  Burke Bloch is a 80 y.o. female. HPI Ms. Ashley Esposito is here for c/o breast pain. She states the pain is mainly in her left breast, but at one time she had pain in the right breast. She states the pain comes and goes and has been present for a long time. She also was advised by endocrinology that she may need \"blood\". Her A1c was 5.5. She c/o always feeling cold. Her h/o anemia is documented on prior hospitalization visits as well as with nephrology. She admits to not following up with nephrology b/c she owes them money. I advised her to contact them and see about getting in again and if she still can't get an appt, to contact me for a referral elsewhere. A past mammogram done in 2014 indicated she should have a 6 month follow up mammogram, but they admit they did not go due to a lot of other things going on at home at the time - such as ill family members. Review of Systems   Constitutional: Negative. Eyes: Negative. Cardiovascular: Positive for leg swelling (slight). Negative for chest pain. Neurological: Negative for dizziness. Physical Exam   Constitutional: She is oriented to person, place, and time. She appears well-developed and well-nourished. No distress. HENT:   Head: Atraumatic. Cardiovascular: Normal rate. An irregular rhythm present. Pulmonary/Chest: Effort normal. She has no wheezes. Left breast exhibits tenderness. Left breast exhibits no mass. Musculoskeletal: She exhibits edema. Neurological: She is alert and oriented to person, place, and time.      Visit Vitals    /63 (BP 1 Location: Left arm, BP Patient Position: Sitting)    Pulse 65    Temp 98.5 °F (36.9 °C) (Oral)    Resp 18    Ht 4' 11\" (1.499 m)    Wt 131 lb (59.4 kg)    SpO2 98%    BMI 26.46 kg/m2   Diabetic foot exam:     Left: Filament test normal sensation with micro filament   Pulse DP: 1+ (weak)   Pulse PT: 1+ (weak)   Deformities: None  Right: Filament test normal sensation with micro filament   Pulse DP: 1+ (weak)   Pulse PT: 1+ (weak)   Deformities: None     EKG shows ventricular ectopic beats. No a-fib. She does have a cardiology appt on 4/15. ASSESSMENT and PLAN    ICD-10-CM ICD-9-CM    1. Anemia, unspecified type D64.9 285.9 CBC WITH AUTOMATED DIFF      IRON PROFILE      VITAMIN B12   2. Breast pain, left N64.4 611.71 KYLE MAMMO BI DX INCL CAD   3. Microcalcifications of the breast R92.0 793.81 KYLE MAMMO BI DX INCL CAD   4. Irregular heart beat I49.9 427.9 AMB POC EKG ROUTINE W/ 12 LEADS, INTER & REP     Pt verbalized understanding of their condition and diagnoses, treatment plan,  as well as side effects of any new medications prescribed. Patient Instructions   If you are not contacted by [facility] to schedule the mammogram, please call them to schedule.  [address]  [facility]  [phone]

## 2018-03-29 NOTE — MR AVS SNAPSHOT
Jason Kaplan 
 
 
 [facility] [address] [facility] [address] 
[phone] Patient: Jessica Tang MRN: [id number] CYT:[birth year] Visit Information Date & Time Provider Department Dept. Phone Encounter #  
 3/29/2018  2:15 PM Lupe Hidalgo PA-C [facility] Franky Gonzalez [phone] Upcoming Health Maintenance Date Due MICROALBUMIN Q1 3/10/2016 LIPID PANEL Q1 3/19/2016 HEMOGLOBIN A1C Q6M 6/30/2016 EYE EXAM RETINAL OR DILATED Q1 6/9/2018 FOOT EXAM Q1 9/27/2018 GLAUCOMA SCREENING Q2Y 6/9/2019 Pneumococcal 65+ Low/Medium Risk (2 of 2 - PPSV23) 9/9/2020 DTaP/Tdap/Td series (2 - Td) 5/19/2026 Allergies as of 3/29/2018  Review Complete On: 3/29/2018 By: Lupe Hidalgo PA-C Severity Noted Reaction Type Reactions Ace Inhibitors High 01/12/2015    Angioedema Pcn [Penicillins]  10/04/2012    Other (comments) Current Immunizations  Never Reviewed Name Date Influenza High Dose Vaccine PF 9/27/2017 Pneumococcal Vaccine (Unspecified Type) 9/9/2015 Not reviewed this visit You Were Diagnosed With   
  
 Codes Comments Anemia, unspecified type    -  Primary ICD-10-CM: D64.9 ICD-9-CM: 285.9 Breast pain, left     ICD-10-CM: N64.4 ICD-9-CM: 611.71 Microcalcifications of the breast     ICD-10-CM: R92.0 ICD-9-CM: 793.81 Irregular heart beat     ICD-10-CM: I49.9 ICD-9-CM: 427.9 Vitals BP Pulse Temp Resp Height(growth percentile) Weight(growth percentile)  
 137/63 (BP 1 Location: Left arm, BP Patient Position: Sitting) 65 98.5 °F (36.9 °C) (Oral) 18 4' 11\" (1.499 m) 131 lb (59.4 kg) SpO2 BMI OB Status Smoking Status 98% 26.46 kg/m2 Hysterectomy Never Smoker Vitals History BMI and BSA Data Body Mass Index Body Surface Area  
 26.46 kg/m 2 1.57 m 2 Preferred Pharmacy Pharmacy Name Phone [phone] [facility] [phone] Your Updated Medication List  
  
   
This list is accurate as of 3/29/18  3:06 PM.  Always use your most recent med list.  
  
  
  
  
 acetaminophen 500 mg tablet Commonly known as:  TYLENOL Take  by mouth every six (6) hours as needed for Pain. albuterol 90 mcg/actuation inhaler Commonly known as:  PROVENTIL HFA, VENTOLIN HFA, PROAIR HFA Take 2 Puffs by inhalation every four (4) hours as needed for Wheezing. albuterol-ipratropium 2.5 mg-0.5 mg/3 ml Nebu Commonly known as:  DUO-NEB  
3 mL by Nebulization route four (4) times daily. aspirin delayed-release 81 mg tablet Take  by mouth daily. carvedilol 12.5 mg tablet Commonly known as:  Lindaann Rose Take 1 Tab by mouth two (2) times daily (with meals). cloNIDine 0.3 mg/24 hr  
Commonly known as:  CATAPRES  
apply 1 patch every week  
  
 docusate sodium 100 mg capsule Commonly known as:  Jessi Finner Take 1 Cap by mouth daily. ferrous sulfate 325 mg (65 mg iron) tablet  
take 1 tablet by mouth once daily BEFORE BREAKFAST  
  
 furosemide 40 mg tablet Commonly known as:  LASIX  
take 1 tablet by mouth daily  
  
 gabapentin 100 mg capsule Commonly known as:  NEURONTIN  
take 1 capsule by mouth at bedtime  
  
 hydrALAZINE 100 mg tablet Commonly known as:  APRESOLINE Take 1 Tab by mouth three (3) times daily. insulin aspart U-100 100 unit/mL Inpn Commonly known as:  NOVOLOG  
by SubCUTAneous route. Sliding scale per endocrinology orders   Indications: type 2 diabetes mellitus  
  
 insulin glargine 100 unit/mL (3 mL) Inpn Commonly known as:  LANTUS,BASAGLAR  
4 Units by SubCUTAneous route daily. Insulin Needles (Disposable) 31 gauge x 5/16\" Ndle Commonly known as:  BD INSULIN PEN NEEDLE UF SHORT Use for bid insulin injections  
  
 insulin NPH/insulin regular 100 unit/mL (70-30) injection Commonly known as:  NOVOLIN 70/30, HUMULIN 70/30 Inject 25 units in the morning beneath the skin. Insulin Syringes (Disposable) 1 mL Syrg Use for bid insulin administration  
  
 ipratropium 0.02 % Soln Commonly known as:  ATROVENT  
2.5 mL by Nebulization route as needed for Wheezing. isosorbide mononitrate ER 30 mg tablet Commonly known as:  IMDUR Take 1 Tab by mouth daily. multivitamin tablet Commonly known as:  ONE A DAY Take 1 Tab by mouth daily. Nebulizer & Compressor machine Use with albuterol solution for chest congestion NIFEdipine ER 90 mg ER tablet Commonly known as:  ADALAT CC  
take 1 tablet by mouth once daily  
  
 omeprazole 40 mg capsule Commonly known as:  PRILOSEC  
take 1 capsule by mouth once daily ONETOUCH ULTRA TEST strip Generic drug:  glucose blood VI test strips  
  
 polyethylene glycol 17 gram/dose powder Commonly known as:  Kia Putnam Take 17 g by mouth daily. VITAMIN D2 50,000 unit capsule Generic drug:  ergocalciferol  
take 1 capsule by mouth every week ON SUNDAY Tracy Sprung Pneumonia, weakness We Performed the Following AMB POC EKG ROUTINE W/ 12 LEADS, INTER & REP [66128 CPT(R)] To-Do List   
 03/29/2018 Imaging:  KYLE MAMMO BI DX INCL CAD Patient Instructions If you are not contacted by [facility] to schedule the mammogram, please call them to schedule. [phone] Amanda Gannon7 [facility] [phone] Introducing [facility]! Yamil Mitchell introduces [employer] patient portal. Now you can access parts of your medical record, email your doctor's office, and request medication refills online. 1. In your internet browser, go to [email or web address] 2. Click on the First Time User? Click Here link in the Sign In box. You will see the New Member Sign Up page. 3. Enter your [employer] Access Code exactly as it appears below.  You will not need to use this code after youve completed the sign-up process. If you do not sign up before the expiration date, you must request a new code. · [employer] Access Code: [email or web address] Expires: 6/27/2018  3:06 PM 
 
4. Enter the last four digits of your Social Security Number (xxxx) and Date of Birth (mm/dd/yyyy) as indicated and click Submit. You will be taken to the next sign-up page. 5. Create a [employer] ID. This will be your [employer] login ID and cannot be changed, so think of one that is secure and easy to remember. 6. Create a [employer] password. You can change your password at any time. 7. Enter your Password Reset Question and Answer. This can be used at a later time if you forget your password. 8. Enter your e-mail address. You will receive e-mail notification when new information is available in [address]. 9. Click Sign Up. You can now view and download portions of your medical record. 10. Click the Download Summary menu link to download a portable copy of your medical information. If you have questions, please visit the Frequently Asked Questions section of the [employer] website. Remember, [employer] is NOT to be used for urgent needs. For medical emergencies, dial 911. Now available from your iPhone and Android! Please provide this summary of care documentation to your next provider. Your primary care clinician is listed as Nikki Steiner. If you have any questions after today's visit, please call [phone].

## 2018-03-30 DIAGNOSIS — N64.4 BREAST PAIN, LEFT: ICD-10-CM

## 2018-03-30 DIAGNOSIS — R92.0 MICROCALCIFICATIONS OF THE BREAST: ICD-10-CM

## 2018-03-30 LAB
ANISOCYTOSIS: ABNORMAL
ERYTHROCYTE [DISTWIDTH] IN BLOOD BY AUTOMATED COUNT: 17.3 % (ref 10–15.5)
FE % SATURATION,PSAT: 43 % (ref 20–50)
HCT VFR BLD AUTO: 28.3 % (ref 35.1–48.3)
HGB BLD-MCNC: 9.3 G/DL (ref 11.7–16.1)
IRON,IRN: 97 MCG/DL (ref 30–160)
LYMPHOCYTES%-DIF,2108: 31 % (ref 20–45)
LYMPHS ABS-DIF,2105: 1.3 K/UL (ref 1–4.8)
MCH RBC QN AUTO: 30 PG (ref 26–34)
MCHC RBC AUTO-ENTMCNC: 33 G/DL (ref 31–36)
MCV RBC AUTO: 92 FL (ref 80–95)
MONOCYTES ABS-DIF,2141: 0.4 K/UL (ref 0.1–1)
MONOCYTES%-DIF,2144: 9 % (ref 3–12)
NEUTROPHILS ABS,2156: 2.5 K/UL (ref 1.8–7.7)
NEUTROPHILS%-DIF,2159: 60 % (ref 40–75)
NORMACHROMIC RBC, 868: ABNORMAL
NORMACYTIC RBC, 869: ABNORMAL
PATH REVIEW OF SMEAR, 12050: NORMAL
PATHOLOGY REVIEW: ABNORMAL
PLATELET # BLD AUTO: 129 K/UL (ref 140–440)
PMV BLD AUTO: 11.4 FL (ref 9–13)
RBC # BLD AUTO: 3.08 M/UL (ref 3.8–5.2)
RBC FRAGMENTS, 1123: ABNORMAL
SMEAR EVAL, 1131: ABNORMAL
TIBC,TIBC: 226 MCG/DL (ref 228–428)
TOTAL CELLS COUNTED, 12021: 100
UIBC SERPL-MCNC: 129 MCG/DL (ref 110–370)
VIT B12 SERPL-MCNC: 1360 PG/ML (ref 211–911)
WBC # BLD AUTO: 4.2 K/UL (ref 4–11)

## 2018-04-02 ENCOUNTER — TELEPHONE (OUTPATIENT)
Dept: INTERNAL MEDICINE CLINIC | Age: 83
End: 2018-04-02

## 2018-04-02 DIAGNOSIS — D63.1 ANEMIA IN CHRONIC KIDNEY DISEASE, UNSPECIFIED CKD STAGE: ICD-10-CM

## 2018-04-02 DIAGNOSIS — E11.22 TYPE 2 DIABETES MELLITUS WITH DIABETIC CHRONIC KIDNEY DISEASE, UNSPECIFIED CKD STAGE, UNSPECIFIED WHETHER LONG TERM INSULIN USE (HCC): Primary | ICD-10-CM

## 2018-04-02 DIAGNOSIS — D63.8 ANEMIA OF CHRONIC DISEASE: ICD-10-CM

## 2018-04-02 DIAGNOSIS — N18.9 ANEMIA IN CHRONIC KIDNEY DISEASE, UNSPECIFIED CKD STAGE: ICD-10-CM

## 2018-04-02 NOTE — TELEPHONE ENCOUNTER
pts daughter states her mom owes them to much money from when her dad was living they wont see her.  She said can you please submit referral to hematology

## 2018-04-02 NOTE — TELEPHONE ENCOUNTER
Her anemia suggests anemia of chronic disease, likely due to her underlying kidney disease and diabetes. Its important she see nephrology. She was supposed to call her nephrologist for an appt. If she sees them and don't address the anemia, then I will send her to hematology.

## 2018-04-03 DIAGNOSIS — G62.9 NEUROPATHY: ICD-10-CM

## 2018-04-03 RX ORDER — GABAPENTIN 100 MG/1
CAPSULE ORAL
Qty: 90 CAP | Refills: 1 | Status: SHIPPED | COMMUNITY
Start: 2018-04-03 | End: 2018-10-10 | Stop reason: SDUPTHER

## 2018-06-30 DIAGNOSIS — K21.9 GASTROESOPHAGEAL REFLUX DISEASE WITHOUT ESOPHAGITIS: ICD-10-CM

## 2018-07-02 RX ORDER — OMEPRAZOLE 40 MG/1
CAPSULE, DELAYED RELEASE ORAL
Qty: 90 CAP | Refills: 1 | Status: SHIPPED | OUTPATIENT
Start: 2018-07-02 | End: 2018-10-22 | Stop reason: SDUPTHER

## 2018-07-05 RX ORDER — CARVEDILOL 12.5 MG/1
TABLET ORAL
Qty: 180 TAB | Refills: 0 | Status: SHIPPED | OUTPATIENT
Start: 2018-07-05 | End: 2019-05-23 | Stop reason: SDUPTHER

## 2018-08-15 ENCOUNTER — TELEPHONE (OUTPATIENT)
Dept: INTERNAL MEDICINE CLINIC | Age: 83
End: 2018-08-15

## 2018-08-15 NOTE — TELEPHONE ENCOUNTER
Calling from home health ([facility])  stated that just completed home visit and the plan is to visit twice this week and three times a week for 5 weeks after this week.

## 2018-08-28 ENCOUNTER — TELEPHONE (OUTPATIENT)
Dept: INTERNAL MEDICINE CLINIC | Age: 83
End: 2018-08-28

## 2018-08-28 NOTE — TELEPHONE ENCOUNTER
Lopez Kay from [facility] called in stating that she blood sugar have been running low and the son stated that normally that is an indication of a UTI for Pt  She has gained 4 pds in the past week, 2 lbs being in one day. She has CHF. Pt.s BP was 110/50 today and 130/72 yesterday. Tami Durham was able to give a verbal for a UA for [facility] to utilize and pt was scheduled for 8/3/18.   An additional called was place from PT stating that they withheld PT due to blood sugar being 72

## 2018-08-30 ENCOUNTER — OFFICE VISIT (OUTPATIENT)
Dept: INTERNAL MEDICINE CLINIC | Age: 83
End: 2018-08-30

## 2018-08-30 VITALS
OXYGEN SATURATION: 98 % | BODY MASS INDEX: 25.4 KG/M2 | HEIGHT: 59 IN | WEIGHT: 126 LBS | HEART RATE: 68 BPM | DIASTOLIC BLOOD PRESSURE: 74 MMHG | SYSTOLIC BLOOD PRESSURE: 144 MMHG | TEMPERATURE: 98.4 F | RESPIRATION RATE: 18 BRPM

## 2018-08-30 DIAGNOSIS — E11.40 TYPE 2 DIABETES MELLITUS WITH DIABETIC NEUROPATHY, WITH LONG-TERM CURRENT USE OF INSULIN (HCC): ICD-10-CM

## 2018-08-30 DIAGNOSIS — R82.71 BACTERIA IN URINE: Primary | ICD-10-CM

## 2018-08-30 DIAGNOSIS — Z79.4 TYPE 2 DIABETES MELLITUS WITH DIABETIC NEUROPATHY, WITH LONG-TERM CURRENT USE OF INSULIN (HCC): ICD-10-CM

## 2018-08-30 DIAGNOSIS — I50.30 DIASTOLIC HEART FAILURE, UNSPECIFIED HF CHRONICITY (HCC): ICD-10-CM

## 2018-08-30 NOTE — PROGRESS NOTES
HISTORY OF PRESENT ILLNESS  Lindy Lennox is a 80 y.o. female. HPI Ms. Doni Dunbar is here to follow up on concerns for a possible UTI. She was admitted from 8/1 to 8/4 for a UTI.   08/01/2018 - 08/04/2018  Hospital Course   Chief Complaint/History of Present Illness:    80 years old female with htn,ckd, dm comes with AMS and dysuria for 2 days    1 AMS delirium- acute metabolic encephalopathy secondary to UTI. Resolved. Alert and oriented, non focal neuro exam and ct head negative  Secondary to Proteus UTI, s/p iv rocephine. 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      She states she has an upcoming appt with cardiology and endocrinology. I advised her she needs to be following up with these specialists on a regular schedule and not just when a problem comes up or she ends up in the hospital.     She was also seen at the ER on 8/9. It looks like at the time she was still on keflex. Home health called on 8/28 for concerns about low blood sugar (in the 70s) and a possible UTI b/c her family states her blood sugar drops when she has a UTI. Home health sent a UA to the lab which does show +bacteria and protein. Will send for a culture prior to treating. Her lantus was decreased to 4 units in the hospital. Advised her to not skip meals. They were also concerned about her weight, that she may have gained 2 lbs with her h/o CHF. Her weight here is actually down 5 lbs. She thinks they may have weighed her at home with her shoes on. Ms. Doni Dunbar advised me that she did not feel weak or shaky with her sugar in the 76s.    I advised her when she has sugar problems, she should be contacting endocrinology. She denies significant burning on urination and states she just feels a little pressure when she has to go. Review of Systems   Constitutional: Negative for chills, fever and malaise/fatigue. Respiratory: Negative for shortness of breath. Cardiovascular: Negative for chest pain and leg swelling. Genitourinary: Negative for dysuria and urgency. Neurological: Negative for dizziness and headaches. Physical Exam   Constitutional: She is oriented to person, place, and time. She appears well-developed and well-nourished. No distress. She is actually more active, and mobile than she had been. Seems to be doing well. She is in the exam room alone and can answer all questions. HENT:   Head: Normocephalic and atraumatic. Eyes: Conjunctivae are normal.   Cardiovascular: Normal rate and regular rhythm. Pulmonary/Chest: Effort normal and breath sounds normal. No respiratory distress. She has no wheezes. Musculoskeletal: She exhibits no edema (no significant edema). Neurological: She is alert and oriented to person, place, and time. Psychiatric: She has a normal mood and affect. Her behavior is normal. Judgment and thought content normal.     Visit Vitals    /74 (BP 1 Location: Left arm, BP Patient Position: Sitting)    Pulse 68    Temp 98.4 °F (36.9 °C) (Oral)    Resp 18    Ht 4' 11\" (1.499 m)    Wt 126 lb (57.2 kg)    SpO2 98%    BMI 25.45 kg/m2     Wt Readings from Last 3 Encounters:   08/30/18 126 lb (57.2 kg)   03/29/18 131 lb (59.4 kg)   09/27/17 129 lb (58.5 kg)         ASSESSMENT and PLAN    ICD-10-CM ICD-9-CM    1. Bacteria in urine R82.71 791.9 CULTURE, URINE - pt needs to bring urine back, was unable to go. 2. Type 2 diabetes mellitus with diabetic neuropathy, with long-term current use of insulin ([facility]) E11.40 250.60   Advised to follow up with endocrinology.  Advised her she should also be following up with nephrology to to CKD    Z79.4 357.2 V58. 67    3. Diastolic heart failure, unspecified HF chronicity (HCC) I50.30 428.30 Should fbe following up with cardiology. Pt verbalized understanding of their condition and diagnoses, treatment plan,  as well as side effects of any new medications prescribed.

## 2018-08-30 NOTE — PROGRESS NOTES
Chief Complaint   Patient presents with    Low Blood Sugar     pt states when she gets up in the morning her sugar is low and then when she eats its high. 1. Have you been to the ER, urgent care clinic since your last visit? Hospitalized since your last visit? No    2. Have you seen or consulted any other health care providers outside of the [address] [place] [address] since your last visit? Include any pap smears or colon screening.  No

## 2018-08-31 DIAGNOSIS — I10 ESSENTIAL HYPERTENSION, BENIGN: ICD-10-CM

## 2018-08-31 RX ORDER — ISOSORBIDE MONONITRATE 30 MG/1
30 TABLET, EXTENDED RELEASE ORAL DAILY
Qty: 90 TAB | Refills: 1 | Status: SHIPPED | OUTPATIENT
Start: 2018-08-31 | End: 2019-05-23 | Stop reason: SDUPTHER

## 2018-09-05 LAB
CREATININE(CRT),U, 723280: 50 MG/DL
MICROALBUMIN UR TEST STR-MCNC: 150 MG/L
MICROALBUMIN/CREAT RATIO POC: >300 MG/G

## 2018-09-07 ENCOUNTER — TELEPHONE (OUTPATIENT)
Dept: INTERNAL MEDICINE CLINIC | Age: 83
End: 2018-09-07

## 2018-09-07 LAB — BACTERIA UR CULT: NORMAL

## 2018-09-07 NOTE — TELEPHONE ENCOUNTER
Patient daughter aware and will bring in new sample if anything changes but she is doing ok for right now

## 2018-09-07 NOTE — TELEPHONE ENCOUNTER
There was mixed bacteria in her urine, so no specific bacteria to treat. If she is having burning or frequency, they recommend submitting another sample.

## 2018-09-10 ENCOUNTER — TELEPHONE (OUTPATIENT)
Dept: INTERNAL MEDICINE CLINIC | Age: 83
End: 2018-09-10

## 2018-09-10 NOTE — TELEPHONE ENCOUNTER
Calling from physical therapy and stated that she will see her one more time this week and then she will release her.

## 2018-09-12 ENCOUNTER — TELEPHONE (OUTPATIENT)
Dept: INTERNAL MEDICINE CLINIC | Age: 83
End: 2018-09-12

## 2018-09-12 NOTE — TELEPHONE ENCOUNTER
Jyoti called from [facility] home health and advised that pt is being discharged today

## 2018-09-13 DIAGNOSIS — I10 ESSENTIAL HYPERTENSION, BENIGN: ICD-10-CM

## 2018-09-13 RX ORDER — HYDRALAZINE HYDROCHLORIDE 100 MG/1
100 TABLET, FILM COATED ORAL 3 TIMES DAILY
Qty: 270 TAB | Refills: 1 | Status: SHIPPED | OUTPATIENT
Start: 2018-09-13 | End: 2019-06-03 | Stop reason: SDUPTHER

## 2018-09-13 RX ORDER — CLONIDINE 0.3 MG/24H
PATCH, EXTENDED RELEASE TRANSDERMAL
Qty: 12 PATCH | Refills: 1 | Status: SHIPPED | OUTPATIENT
Start: 2018-09-13 | End: 2018-10-22 | Stop reason: DRUGHIGH

## 2018-10-08 RX ORDER — LANOLIN ALCOHOL/MO/W.PET/CERES
CREAM (GRAM) TOPICAL
Qty: 90 TAB | Refills: 1 | Status: SHIPPED | OUTPATIENT
Start: 2018-10-08 | End: 2019-09-02 | Stop reason: SDUPTHER

## 2018-10-09 RX ORDER — NIFEDIPINE 90 MG/1
TABLET, FILM COATED, EXTENDED RELEASE ORAL
Qty: 90 TAB | Refills: 0 | Status: SHIPPED | OUTPATIENT
Start: 2018-10-09 | End: 2018-10-22 | Stop reason: SDUPTHER

## 2018-10-10 DIAGNOSIS — G62.9 NEUROPATHY: ICD-10-CM

## 2018-10-10 RX ORDER — GABAPENTIN 100 MG/1
CAPSULE ORAL
Qty: 90 CAP | Refills: 1 | Status: SHIPPED | OUTPATIENT
Start: 2018-10-10 | End: 2019-05-29 | Stop reason: SDUPTHER

## 2018-10-22 ENCOUNTER — OFFICE VISIT (OUTPATIENT)
Dept: INTERNAL MEDICINE CLINIC | Age: 83
End: 2018-10-22

## 2018-10-22 VITALS
SYSTOLIC BLOOD PRESSURE: 160 MMHG | OXYGEN SATURATION: 96 % | DIASTOLIC BLOOD PRESSURE: 74 MMHG | HEART RATE: 56 BPM | BODY MASS INDEX: 25.4 KG/M2 | RESPIRATION RATE: 18 BRPM | HEIGHT: 59 IN | WEIGHT: 126 LBS | TEMPERATURE: 98.3 F

## 2018-10-22 DIAGNOSIS — Z23 ENCOUNTER FOR IMMUNIZATION: ICD-10-CM

## 2018-10-22 DIAGNOSIS — K21.9 GASTROESOPHAGEAL REFLUX DISEASE WITHOUT ESOPHAGITIS: ICD-10-CM

## 2018-10-22 DIAGNOSIS — R30.9 URINARY PAIN: Primary | ICD-10-CM

## 2018-10-22 DIAGNOSIS — K59.09 OTHER CONSTIPATION: ICD-10-CM

## 2018-10-22 DIAGNOSIS — E55.9 VITAMIN D DEFICIENCY: ICD-10-CM

## 2018-10-22 PROBLEM — E11.21 TYPE 2 DIABETES WITH NEPHROPATHY (HCC): Status: ACTIVE | Noted: 2018-10-22

## 2018-10-22 RX ORDER — FLUTICASONE PROPIONATE AND SALMETEROL 500; 50 UG/1; UG/1
1 POWDER RESPIRATORY (INHALATION) EVERY 12 HOURS
COMMUNITY

## 2018-10-22 RX ORDER — OMEPRAZOLE 40 MG/1
CAPSULE, DELAYED RELEASE ORAL
Qty: 90 CAP | Refills: 1 | Status: SHIPPED | OUTPATIENT
Start: 2018-10-22 | End: 2019-05-23 | Stop reason: SDUPTHER

## 2018-10-22 RX ORDER — CLONIDINE 0.2 MG/24H
PATCH, EXTENDED RELEASE TRANSDERMAL
Qty: 12 PATCH | Refills: 1 | Status: SHIPPED | OUTPATIENT
Start: 2018-10-22

## 2018-10-22 RX ORDER — ARM BRACE
EACH MISCELLANEOUS
Qty: 1 UNITS | Refills: 0 | Status: SHIPPED | OUTPATIENT
Start: 2018-10-22

## 2018-10-22 RX ORDER — ERGOCALCIFEROL 1.25 MG/1
CAPSULE ORAL
Qty: 12 CAP | Refills: 1 | Status: SHIPPED | OUTPATIENT
Start: 2018-10-22 | End: 2019-05-29 | Stop reason: SDUPTHER

## 2018-10-22 RX ORDER — NIFEDIPINE 90 MG/1
TABLET, FILM COATED, EXTENDED RELEASE ORAL
Qty: 90 TAB | Refills: 1 | Status: SHIPPED | OUTPATIENT
Start: 2018-10-22 | End: 2019-05-23 | Stop reason: SDUPTHER

## 2018-10-22 RX ORDER — CLONIDINE 0.2 MG/24H
PATCH, EXTENDED RELEASE TRANSDERMAL
Refills: 0 | COMMUNITY
Start: 2018-10-12 | End: 2018-10-22 | Stop reason: SDUPTHER

## 2018-10-22 RX ORDER — DOCUSATE SODIUM 100 MG/1
100 CAPSULE, LIQUID FILLED ORAL DAILY
Qty: 90 CAP | Refills: 1 | Status: SHIPPED | OUTPATIENT
Start: 2018-10-22 | End: 2019-05-29 | Stop reason: SDUPTHER

## 2018-10-22 RX ORDER — POLYETHYLENE GLYCOL 3350 17 G/17G
17 POWDER, FOR SOLUTION ORAL DAILY
Qty: 510 G | Refills: 3 | Status: SHIPPED | OUTPATIENT
Start: 2018-10-22 | End: 2019-05-29 | Stop reason: SDUPTHER

## 2018-10-22 NOTE — PROGRESS NOTES
Chief Complaint Patient presents with  
[facility] Follow Up  
 Urinary Pain  Arthritis  
  in finger on bilat hands 1. Have you been to the ER, urgent care clinic since your last visit? Hospitalized since your last visit? Yes Where: [facility] 2. Have you seen or consulted any other health care providers outside of the [address] [place] [address] since your last visit? Include any pap smears or colon screening.  No

## 2018-10-22 NOTE — PROGRESS NOTES
HISTORY OF PRESENT ILLNESS Elaine Almendarez is a 80 y.o. female. HPI Ms. Nick Dyer is here for follow up after being admitted to the hospital. She went to the hospital b/c she had been spitting up and having chest discomfort. She was discharged with a RX for carafate but her daughter states they can't afford it. Her clonidine was decreased from 0.3mg to 0.2 mg.  
Currently she is having some dysuria that has been going on for 2-3 days. Will get a urine culture. She denies any fever, chills, confusion etc.  
She also tells me that her endocrinologist advised that she follow up on her anemia. I reviewed nephrology records which do indicate known anemia of chronic disease/kidney failure and she was ordered to start epogen 2 weeks from that visit which was several months ago. She has not followed up. Transition of Care Admit Date: 10/9/2018 D/C Date: 10/11/2018 Patient Class: Observation/Short Stay Code Status at Time of Discharge: DNR/DNI Discharge Medications: 
Current Discharge Medication List  
 
START taking these medications Details  
cloNIDine (CATAPRES) 0.2 mg/24 hr TD PTWK Apply 1 Patch as directed Every 7 Days. Qty: 60 Patch, Refills: 0  
 
sucralfate (CARAFATE) 100 mg/mL PO suspension Take 10 mL by Mouth 4 Times a Day Before Meals & at Bedtime for 30 days. Qty: 1 Bottle, Refills: 0 CONTINUE these medications which have NOT CHANGED Details  
acetaminophen (TYLENOL) 500 mg PO TABS Take 1 Tab by Mouth Every 6 Hours As Needed for Pain or Fever. aspirin 81 mg PO CHEW Take 1 Tab by Mouth Once a Day. Qty: 30 Tab, Refills: 1  
 
carvedilol (COREG) 12.5 mg PO TABS Take 12.5 mg by Mouth 2 Times Daily with Meals. docusate sodium (COLACE) 100 mg PO CAPS Take 1 Cap by Mouth Once a Day. Qty: 30 Cap, Refills: 3  
 
ergocalciferol (VITAMIN D2) 50,000 unit PO CAPS Take 1 Cap by Mouth Every Sunday. Qty: 4 Cap, Refills: 3 ferrous sulfate (FEOSOL) 325 mg (65 mg Iron) PO TABS Take 1 Tab by Mouth Once a Day. Qty: 30 Tab, Refills: 3  
 
fluticasone-salmeterol (ADVAIR) 500-50 mcg/dose INH DsDv Take 1 Puff inhaled by mouth Twice Daily. Qty: 1 Inhaler, Refills: 0  
 
furosemide (LASIX) 40 mg PO TABS Take 40 mg by Mouth Once a Day. gabapentin (NEURONTIN) 100 mg PO CAPS Take 100 mg by Mouth Every Night at Bedtime. hydrALAzine (APRESOLINE) 100 mg PO TABS Take 100 mg by Mouth 3 Times Daily. Insulin Glargine (LANTUS SOLOSTAR) 100 unit/mL (3 mL) SC insulin pen Inject 5 Units beneath the skin Once a Day. Qty: 1 Box, Refills: 0  
 
ipratropium-albuterol (DUO-NEB) 0.5 mg-3 mg(2.5 mg base)/3 mL INH NEBU Take 3 mL inhaled by mouth Every 6 Hours As Needed. isosorbide mononitrate CR (IMDUR) 30 mg PO TB24 Take 30 mg by Mouth Once a Day. MULTIVITAMIN PO Take 1 Tab by Mouth Once a Day. NIFEdipine 24HR (PROCARDIA XL) 90 mg PO TR24 Take 1 Tab by Mouth Once a Day. Qty: 30 Tab, Refills: 0 Omeprazole 40 mg PO CPDR Take 40 mg by Mouth Once a Day. oxyCODONE (ROXICODONE) 5 mg PO TABS Take 0.5 Tabs by Mouth Every 8 Hours As Needed for Pain. Qty: 5 Tab, Refills: 0  
 
polyethylene glycol (MIRALAX) 17 gram PO PwPk Take 1 Packet by Mouth Once a Day. Qty: 30 Packet, Refills: 3 VENTOLIN HFA 90 mcg/actuation INH HFAA 1-2 Puffs Every 6 Hours As Needed for Wheezing. STOP taking these medications  
 
cloNIDine (CATAPRES) 0.3 mg/24 hr TD PTWK Comments:  
Reason for Stopping:  
 
 
 
 
Hospital Course Chief Complaint/History of Present Illness:  
Thang Ireland is a 80y.o. year old female 's past medical history of hypertension, insulin-dependent diabetes, chronic kidney disease stage III, diabetic neuropathy who presents with chest pain. Patient was brought by her daughter . In emergency room symptoms resolved. Patient denies any shortness of breath, fevers, cough, leg edema. Atypical chest pain resolved -Troponins negative, stress test negative 
-con ASA 
-EKG non spec ST chenges similar to prior EKG 
- chest pain likely GI related, started carfate, continue PPI 
- patient's daughter wishes to have mammogram done, advised to f/u with PCP in 1 week for consideration of mamogram 
 
HTN uncontrolled 
-cont coreg, hydralazine, procardia and imdur. Clonidine patch increased  
 
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 
  
Dm type 2 with Diabetic neuropathy  
-cn Insulin correctional scale, lantus 5 units and neurontin 
  
Chronic anemia of CKD 
-monitor , stable Patient was discharged home in stable condition. Active Hospital Problems Diagnosis  Chest pain [R07.9] Resolved Hospital Problems Diagnosis No resolved problems to display. Discharge Status Constitutional: She is well-developed. No distress. Cardiovascular: Normal rate and regular rhythm. No murmur heard. Respiratory: Effort normal and breath sounds normal. She has no wheezes. She has no rales. Abdominal: Soft. Bowel sounds are normal. She exhibits no distension. There is no tenderness. Neurological: She is alert. She is oriented to person, place, and time. No cranial nerve deficit. Psychiatric: Mood: Mood/affect normal.  
 
 
BP: 150/66 (manual)  Heart Rate: 65 (10/11/18 0733)  Pulse: 64 (10/11/18 1220)  Temp: 98.2 °F (36.8 °C)  Resp: 18  Height: 59\" (149.9 cm)  Weight: 56.4 kg (124 lb 5.4 oz)  BMI (Calculated): 25.1  SpO2: 97 %  Temp (24hrs), Av °F (36.7 °C), Min:97.8 °F (36.6 °C), Max:98.2 °F (36.8 °C) Patient status at discharge: afebrile, eating, drinking, voiding and ambulating Review of Systems Constitutional: Negative. Respiratory: Negative for shortness of breath. Cardiovascular: Negative. Gastrointestinal: Positive for constipation. Negative for heartburn and nausea. +reflux/heartburn. Sx have improved since hospitalization Genitourinary: Positive for dysuria and frequency. Neurological: Negative for dizziness. Physical Exam  
Constitutional: She is oriented to person, place, and time. She appears well-developed and well-nourished. No distress. HENT:  
Head: Normocephalic and atraumatic. Eyes: Conjunctivae are normal.  
Cardiovascular: Normal rate and regular rhythm. Pulmonary/Chest: Effort normal. She has no wheezes. Musculoskeletal: She exhibits no edema. Neurological: She is alert and oriented to person, place, and time. Psychiatric: She has a normal mood and affect. Her behavior is normal. Judgment and thought content normal.  
 
 
ASSESSMENT and PLAN 
  ICD-10-CM ICD-9-CM 1. Urinary pain R30.9 788.1 CULTURE, URINE  
2. Gastroesophageal reflux disease without esophagitis K21.9 530.81 omeprazole (PRILOSEC) 40 mg capsule 3. Other constipation K59.09 564.09 polyethylene glycol (LAXACLEAR) 17 gram/dose powder  
   docusate sodium (COLACE) 100 mg capsule 4. Vitamin D deficiency E55.9 268.9 ergocalciferol (VITAMIN D2) 50,000 unit capsule 5. Encounter for immunization Z23 V03.89 INFLUENZA VACCINE INACTIVATED (IIV), SUBUNIT, ADJUVANTED, IM  
   ADMIN INFLUENZA VIRUS VAC Pt verbalized understanding of their condition and diagnoses, treatment plan,  as well as side effects of any new medications prescribed.

## 2018-10-23 NOTE — PATIENT INSTRUCTIONS

## 2018-10-25 ENCOUNTER — TELEPHONE (OUTPATIENT)
Dept: INTERNAL MEDICINE CLINIC | Age: 83
End: 2018-10-25

## 2018-10-25 DIAGNOSIS — N39.0 URINARY TRACT INFECTION WITHOUT HEMATURIA, SITE UNSPECIFIED: Primary | ICD-10-CM

## 2018-10-25 LAB — BACTERIA UR CULT: ABNORMAL

## 2018-10-25 RX ORDER — SULFAMETHOXAZOLE AND TRIMETHOPRIM 400; 80 MG/1; MG/1
1 TABLET ORAL 2 TIMES DAILY
Qty: 14 TAB | Refills: 0 | Status: SHIPPED | OUTPATIENT
Start: 2018-10-25 | End: 2019-09-12 | Stop reason: SDUPTHER

## 2018-12-06 ENCOUNTER — PATIENT OUTREACH (OUTPATIENT)
Dept: FAMILY MEDICINE CLINIC | Age: 83
End: 2018-12-06

## 2019-05-23 ENCOUNTER — OFFICE VISIT (OUTPATIENT)
Dept: INTERNAL MEDICINE CLINIC | Age: 84
End: 2019-05-23

## 2019-05-23 VITALS
DIASTOLIC BLOOD PRESSURE: 66 MMHG | BODY MASS INDEX: 26.41 KG/M2 | HEIGHT: 59 IN | HEART RATE: 69 BPM | SYSTOLIC BLOOD PRESSURE: 210 MMHG | OXYGEN SATURATION: 97 % | TEMPERATURE: 98.5 F | WEIGHT: 131 LBS | RESPIRATION RATE: 18 BRPM

## 2019-05-23 DIAGNOSIS — I10 HYPERTENSION, UNSPECIFIED TYPE: Primary | ICD-10-CM

## 2019-05-23 DIAGNOSIS — I10 ESSENTIAL HYPERTENSION, BENIGN: ICD-10-CM

## 2019-05-23 DIAGNOSIS — R60.9 EDEMA, UNSPECIFIED TYPE: ICD-10-CM

## 2019-05-23 DIAGNOSIS — K21.9 GASTROESOPHAGEAL REFLUX DISEASE WITHOUT ESOPHAGITIS: ICD-10-CM

## 2019-05-23 RX ORDER — FUROSEMIDE 40 MG/1
TABLET ORAL
Qty: 90 TAB | Refills: 0 | Status: SHIPPED | OUTPATIENT
Start: 2019-05-23 | End: 2019-09-02 | Stop reason: SDUPTHER

## 2019-05-23 RX ORDER — ISOSORBIDE MONONITRATE 30 MG/1
30 TABLET, EXTENDED RELEASE ORAL DAILY
Qty: 90 TAB | Refills: 0 | Status: SHIPPED | OUTPATIENT
Start: 2019-05-23 | End: 2019-09-02 | Stop reason: SDUPTHER

## 2019-05-23 RX ORDER — CARVEDILOL 12.5 MG/1
TABLET ORAL
Qty: 180 TAB | Refills: 0 | Status: SHIPPED | OUTPATIENT
Start: 2019-05-23 | End: 2019-09-02 | Stop reason: SDUPTHER

## 2019-05-23 RX ORDER — OMEPRAZOLE 40 MG/1
CAPSULE, DELAYED RELEASE ORAL
Qty: 90 CAP | Refills: 0 | Status: SHIPPED | OUTPATIENT
Start: 2019-05-23 | End: 2019-09-02 | Stop reason: SDUPTHER

## 2019-05-23 RX ORDER — NIFEDIPINE 90 MG/1
TABLET, FILM COATED, EXTENDED RELEASE ORAL
Qty: 90 TAB | Refills: 0 | Status: SHIPPED | OUTPATIENT
Start: 2019-05-23 | End: 2019-09-02 | Stop reason: SDUPTHER

## 2019-05-23 NOTE — PROGRESS NOTES
Chief Complaint   Patient presents with    Foot Swelling     bilat     1. Have you been to the ER, urgent care clinic since your last visit? Hospitalized since your last visit? No    2. Have you seen or consulted any other health care providers outside of the [address] [place] [address] since your last visit? Include any pap smears or colon screening.  No

## 2019-05-23 NOTE — PATIENT INSTRUCTIONS
Leg and Ankle Edema: Care Instructions  Your Care Instructions  Swelling in the legs, ankles, and feet is called edema. It is common after you sit or stand for a while. Long plane flights or car rides often cause swelling in the legs and feet. You may also have swelling if you have to stand for long periods of time at your job. Problems with the veins in the legs (varicose veins) and changes in hormones can also cause swelling. Sometimes the swelling in the ankles and feet is caused by a more serious problem, such as heart failure, infection, blood clots, or liver or kidney disease. Follow-up care is a key part of your treatment and safety. Be sure to make and go to all appointments, and call your doctor if you are having problems. It's also a good idea to know your test results and keep a list of the medicines you take. How can you care for yourself at home? · If your doctor gave you medicine, take it as prescribed. Call your doctor if you think you are having a problem with your medicine. · Whenever you are resting, raise your legs up. Try to keep the swollen area higher than the level of your heart. · Take breaks from standing or sitting in one position. ? Walk around to increase the blood flow in your lower legs. ? Move your feet and ankles often while you stand, or tighten and relax your leg muscles. · Wear support stockings. Put them on in the morning, before swelling gets worse. · Eat a balanced diet. Lose weight if you need to. · Limit the amount of salt (sodium) in your diet. Salt holds fluid in the body and may increase swelling. When should you call for help? Call 911 anytime you think you may need emergency care. For example, call if:    · You have symptoms of a blood clot in your lung (called a pulmonary embolism). These may include:  ? Sudden chest pain. ? Trouble breathing. ?  Coughing up blood.    Call your doctor now or seek immediate medical care if:    · You have signs of a blood clot, such as:  ? Pain in your calf, back of the knee, thigh, or groin. ? Redness and swelling in your leg or groin.     · You have symptoms of infection, such as:  ? Increased pain, swelling, warmth, or redness. ? Red streaks or pus. ? A fever.    Watch closely for changes in your health, and be sure to contact your doctor if:    · Your swelling is getting worse.     · You have new or worsening pain in your legs.     · You do not get better as expected. Where can you learn more? Go to http://ramírez-jody.info/. Enter N641 in the search box to learn more about \"Leg and Ankle Edema: Care Instructions. \"  Current as of: September 23, 2018  Content Version: 11.9  © 1509-3064 [employer]. Care instructions adapted under license by [employer] (which disclaims liability or warranty for this information). If you have questions about a medical condition or this instruction, always ask your healthcare professional. [place] [address] any warranty or liability for your use of this information.

## 2019-05-23 NOTE — TELEPHONE ENCOUNTER
pts daughter called stating her mother is being released and she needs meds sent to the pharmacy and that the dosages we have are still the same

## 2019-05-23 NOTE — PROGRESS NOTES
HISTORY OF PRESENT ILLNESS  Praveen Chappell is a 80 y.o. female. HPI Ms. Laci Bennett is here with her daughter due to increased swelling in her leg. According to a call we placed to cardiology, she was supposed to have been seen in 1 year from last April, so she is overdue. She has been out of her clonidine for \"weeks\" b/c her daughter said she \"ran out\" and it is expensive. She also states she has been out of potassium pills, but has still been taking the lasix. However, when I ask how she is taking the medications, they can't tell me. Lasix is written for 1/2 tablet twice daily, but her daughter states she is taking a whole tablet. Her and her daughter are poor historians. Review of Systems   Constitutional: Positive for malaise/fatigue. Respiratory: Positive for shortness of breath (with walking). Negative for cough and wheezing. Cardiovascular: Positive for leg swelling. Gastrointestinal: Negative. Neurological: Positive for dizziness (slight). Physical Exam   Constitutional: She is oriented to person, place, and time. She appears well-developed and well-nourished. No distress. HENT:   Head: Normocephalic and atraumatic. Cardiovascular: Normal rate. Pulmonary/Chest: Effort normal.   Musculoskeletal: She exhibits edema (+2-3 edema). Neurological: She is alert and oriented to person, place, and time. Visit Vitals  BP (!) 210/66 (BP 1 Location: Left arm, BP Patient Position: Sitting)   Pulse 69   Temp 98.5 °F (36.9 °C) (Oral)   Resp 18   Ht 4' 11\" (1.499 m)   Wt 131 lb (59.4 kg)   SpO2 97%   BMI 26.46 kg/m²     Wt Readings from Last 3 Encounters:   05/23/19 131 lb (59.4 kg)   10/22/18 126 lb (57.2 kg)   08/30/18 126 lb (57.2 kg)       ASSESSMENT and PLAN    ICD-10-CM ICD-9-CM    1. Hypertension, unspecified type I10 401.9    2.  Edema, unspecified type R60.9 782.3    advised with her abnormal BP, uncertainty of medications that she is on, edema and dyspnea, advised her to go to the ER.   Reminded her and her daughter they need to comply with specialty appointments and follow up as advised. Pt verbalized understanding of their condition and diagnoses, treatment plan,  as well as side effects of any new medications prescribed.

## 2019-05-29 DIAGNOSIS — E55.9 VITAMIN D DEFICIENCY: ICD-10-CM

## 2019-05-29 DIAGNOSIS — G62.9 NEUROPATHY: ICD-10-CM

## 2019-05-29 DIAGNOSIS — K59.09 OTHER CONSTIPATION: ICD-10-CM

## 2019-05-29 NOTE — TELEPHONE ENCOUNTER
Pt's daughter is calling to request refill's for the following:    Potassium  Vitamin D  Laxaclear  Colace

## 2019-05-30 RX ORDER — DOCUSATE SODIUM 100 MG/1
100 CAPSULE, LIQUID FILLED ORAL DAILY
Qty: 90 CAP | Refills: 1 | Status: SHIPPED | OUTPATIENT
Start: 2019-05-30 | End: 2019-05-30 | Stop reason: SDUPTHER

## 2019-05-30 RX ORDER — ERGOCALCIFEROL 1.25 MG/1
CAPSULE ORAL
Qty: 12 CAP | Refills: 1 | Status: SHIPPED | OUTPATIENT
Start: 2019-05-30 | End: 2019-12-09

## 2019-05-30 RX ORDER — GABAPENTIN 100 MG/1
CAPSULE ORAL
Qty: 90 CAP | Refills: 1 | Status: SHIPPED | OUTPATIENT
Start: 2019-05-30

## 2019-05-30 RX ORDER — ERGOCALCIFEROL 1.25 MG/1
CAPSULE ORAL
Qty: 12 CAP | Refills: 0 | Status: SHIPPED | OUTPATIENT
Start: 2019-05-30 | End: 2019-05-30 | Stop reason: SDUPTHER

## 2019-05-30 RX ORDER — POLYETHYLENE GLYCOL 3350 17 G/17G
17 POWDER, FOR SOLUTION ORAL DAILY
Qty: 510 G | Refills: 3 | Status: SHIPPED | OUTPATIENT
Start: 2019-05-30

## 2019-05-30 RX ORDER — DOCUSATE SODIUM 100 MG/1
CAPSULE, LIQUID FILLED ORAL
Qty: 90 CAP | Refills: 1 | Status: SHIPPED | OUTPATIENT
Start: 2019-05-30

## 2019-06-03 RX ORDER — HYDRALAZINE HYDROCHLORIDE 100 MG/1
TABLET, FILM COATED ORAL
Qty: 270 TAB | Refills: 1 | Status: SHIPPED | OUTPATIENT
Start: 2019-06-03 | End: 2019-12-09 | Stop reason: SDUPTHER

## 2019-06-03 NOTE — TELEPHONE ENCOUNTER
Her potassium was in a normal range at the ER. So unless she had been taking potassium all this time, she wouldn't need to start it. Her potassium can increase b/c of her poor kidney function.  Most importantly is that she needs to see the kidney doctor

## 2019-06-03 NOTE — TELEPHONE ENCOUNTER
Pt's daughter is calling to request a refill for:    hydrALAZINE (APRESOLINE) 100 mg tablet    Pt also wants to start taking potassium. States while in the hospital they told her since her BP is down she can take it. Please advise.

## 2019-06-04 ENCOUNTER — TELEPHONE (OUTPATIENT)
Dept: INTERNAL MEDICINE CLINIC | Age: 84
End: 2019-06-04

## 2019-06-04 NOTE — TELEPHONE ENCOUNTER
Daughter called to say that moms legs were very swollen with fluid. No SOB at this time but she says her mom weighed 128lb s yesterday but today weighs 141lbs. I asked her to take her mom to the ER. Her daughter said she was going to take her right now.

## 2019-06-07 RX ORDER — POTASSIUM CHLORIDE 20 MEQ/1
20 TABLET, EXTENDED RELEASE ORAL DAILY
Qty: 90 TAB | Refills: 1 | Status: SHIPPED | OUTPATIENT
Start: 2019-06-07 | End: 2019-12-09 | Stop reason: SDUPTHER

## 2019-06-12 ENCOUNTER — TELEPHONE (OUTPATIENT)
Dept: INTERNAL MEDICINE CLINIC | Age: 84
End: 2019-06-12

## 2019-09-12 ENCOUNTER — OFFICE VISIT (OUTPATIENT)
Dept: INTERNAL MEDICINE CLINIC | Age: 84
End: 2019-09-12

## 2019-09-12 VITALS
OXYGEN SATURATION: 96 % | WEIGHT: 127 LBS | HEIGHT: 59 IN | HEART RATE: 56 BPM | DIASTOLIC BLOOD PRESSURE: 73 MMHG | SYSTOLIC BLOOD PRESSURE: 161 MMHG | BODY MASS INDEX: 25.6 KG/M2 | RESPIRATION RATE: 16 BRPM | TEMPERATURE: 98.5 F

## 2019-09-12 DIAGNOSIS — R10.9 RIGHT FLANK PAIN: Primary | ICD-10-CM

## 2019-09-12 DIAGNOSIS — N30.00 ACUTE CYSTITIS WITHOUT HEMATURIA: ICD-10-CM

## 2019-09-12 DIAGNOSIS — G47.00 INSOMNIA, UNSPECIFIED TYPE: ICD-10-CM

## 2019-09-12 DIAGNOSIS — N39.0 URINARY TRACT INFECTION WITHOUT HEMATURIA, SITE UNSPECIFIED: ICD-10-CM

## 2019-09-12 LAB
BILIRUB UR QL STRIP: NEGATIVE
GLUCOSE UR-MCNC: NEGATIVE MG/DL
KETONES P FAST UR STRIP-MCNC: NEGATIVE MG/DL
PH UR STRIP: 5.5 [PH] (ref 4.6–8)
PROT UR QL STRIP: NORMAL
SP GR UR STRIP: 1.01 (ref 1–1.03)
UA UROBILINOGEN AMB POC: NORMAL (ref 0.2–1)
URINALYSIS CLARITY POC: CLEAR
URINALYSIS COLOR POC: YELLOW
URINE BLOOD POC: NEGATIVE
URINE LEUKOCYTES POC: NORMAL
URINE NITRITES POC: NEGATIVE

## 2019-09-12 RX ORDER — RAMELTEON 8 MG/1
8 TABLET ORAL
Qty: 30 TAB | Refills: 3 | Status: SHIPPED | OUTPATIENT
Start: 2019-09-12 | End: 2019-12-09 | Stop reason: SDUPTHER

## 2019-09-12 RX ORDER — SULFAMETHOXAZOLE AND TRIMETHOPRIM 400; 80 MG/1; MG/1
1 TABLET ORAL 2 TIMES DAILY
Qty: 14 TAB | Refills: 0 | Status: SHIPPED | OUTPATIENT
Start: 2019-09-12 | End: 2019-12-09

## 2019-09-12 NOTE — PROGRESS NOTES
Chief Complaint   Patient presents with    Back Pain     Right side. No urinary complaints. 1 wek. 1. Have you been to the ER, urgent care clinic since your last visit? Hospitalized since your last visit? No    2. Have you seen or consulted any other health care providers outside of the [address] [place] [address] since your last visit? Include any pap smears or colon screening.  No     3 most recent PHQ Screens 9/12/2019   Little interest or pleasure in doing things Not at all   Feeling down, depressed, irritable, or hopeless Not at all   Total Score PHQ 2 0

## 2019-09-14 LAB
BACTERIA UR CULT: ABNORMAL
BACTERIA UR CULT: ABNORMAL

## 2019-09-23 NOTE — PROGRESS NOTES
HISTORY OF PRESENT ILLNESS  Nayla Shea is a 80 y.o. female. HPI   Pt is c/o right lower back pain x 1 week with no injury. Denies radiating pain, numbness, tingling, N/V/D, dysuria, hematuria, and urinary frequency. Allergies   Allergen Reactions    Ace Inhibitors Angioedema    Pcn [Penicillins] Other (comments)       Current Outpatient Medications   Medication Sig    trimethoprim-sulfamethoxazole (BACTRIM, SEPTRA)  mg per tablet Take 1 Tab by mouth two (2) times a day.  ramelteon (ROZEREM) 8 mg tablet Take 1 Tab by mouth nightly.  omeprazole (PRILOSEC) 40 mg capsule take 1 capsule by mouth once daily    NIFEdipine ER (ADALAT CC) 90 mg ER tablet take 1 tablet by mouth once daily    ferrous sulfate 325 mg (65 mg iron) tablet take 1 tablet by mouth once daily before breakfast    carvedilol (COREG) 12.5 mg tablet take 1 tablet by mouth twice a day    furosemide (LASIX) 40 mg tablet take 1/2 tablet every morning and 1/2 tablet IN THE AFTERNOON    isosorbide mononitrate ER (IMDUR) 30 mg tablet take 1 tablet by mouth once daily    COL-RITE 100 mg capsule take 1 capsule by mouth daily    fluticasone-salmeterol (ADVAIR DISKUS) 500-50 mcg/dose diskus inhaler Take 1 Puff by inhalation every twelve (12) hours.  cloNIDine (CATAPRES) 0.2 mg/24 hr patch apply 1 patch every week    insulin glargine (LANTUS SOLOSTAR) 100 unit/mL (3 mL) pen 4 Units by SubCUTAneous route daily.  multivitamin (ONE A DAY) tablet Take 1 Tab by mouth daily.  aspirin delayed-release 81 mg tablet Take  by mouth daily.  potassium chloride (K-DUR, KLOR-CON) 20 mEq tablet Take 1 Tab by mouth daily.  hydrALAZINE (APRESOLINE) 100 mg tablet take 1 tablet by mouth three times a day    polyethylene glycol (LAXACLEAR) 17 gram/dose powder Take 17 g by mouth daily.     gabapentin (NEURONTIN) 100 mg capsule take 1 capsule at bedtime    ergocalciferol (VITAMIN D2) 50,000 unit capsule take 1 capsule by mouth every week ON SUNDAY    Back Brace misc Use for chronic back pain    albuterol-ipratropium (DUO-NEB) 2.5 mg-0.5 mg/3 ml nebu 3 mL by Nebulization route four (4) times daily.  acetaminophen (TYLENOL) 500 mg tablet Take  by mouth every six (6) hours as needed for Pain.  ipratropium (ATROVENT) 0.02 % nebulizer solution 2.5 mL by Nebulization route as needed for Wheezing.  insulin NPH/insulin regular (NOVOLIN 70/30, HUMULIN 70/30) 100 unit/mL (70-30) injection Inject 25 units in the morning beneath the skin. (Patient taking differently: 10 Units by SubCUTAneous route two (2) times a day. Inject 25 units in the morning beneath the skin.)    Insulin Needles, Disposable, (BD INSULIN PEN NEEDLE UF SHORT) 31 gauge x 5/16\" ndle Use for bid insulin injections    Insulin Syringes, Disposable, 1 mL syrg Use for bid insulin administration    ONETOUCH ULTRA TEST strip     Nebulizer & Compressor machine Use with albuterol solution for chest congestion    Walker misc Pneumonia, weakness     No current facility-administered medications for this visit. Review of Systems   Constitutional: Positive for malaise/fatigue. HENT: Negative for congestion, ear pain, sinus pain, sore throat and tinnitus. Respiratory: Negative for cough, shortness of breath and wheezing. Cardiovascular: Negative for chest pain, palpitations and leg swelling. Gastrointestinal: Negative. Genitourinary: Positive for flank pain. Negative for dysuria, frequency and urgency. Musculoskeletal: Negative for myalgias. Neurological: Positive for dizziness (slight). Visit Vitals  /73 (BP 1 Location: Right arm, BP Patient Position: Sitting)   Pulse (!) 56   Temp 98.5 °F (36.9 °C) (Oral)   Resp 16   Ht 4' 11\" (1.499 m)   Wt 127 lb (57.6 kg)   SpO2 96%   BMI 25.65 kg/m²       Physical Exam   Constitutional: She is oriented to person, place, and time. No distress. HENT:   Head: Normocephalic and atraumatic.    Cardiovascular: Normal rate and regular rhythm. Pulmonary/Chest: Effort normal and breath sounds normal.   Abdominal: Soft. Bowel sounds are normal. There is CVA tenderness (right). Neurological: She is alert and oriented to person, place, and time. Skin: Skin is warm and dry. She is not diaphoretic. Psychiatric: She has a normal mood and affect. Her behavior is normal.       ASSESSMENT and PLAN    ICD-10-CM ICD-9-CM    1. Right flank pain R10.9 789.09 AMB POC URINALYSIS DIP STICK AUTO W/O MICRO   2. Acute cystitis without hematuria N30.00 595.0 CULTURE, URINE   3. Urinary tract infection without hematuria, site unspecified N39.0 599.0 trimethoprim-sulfamethoxazole (BACTRIM, SEPTRA)  mg per tablet   4. Insomnia, unspecified type G47.00 780.52 ramelteon (ROZEREM) 8 mg tablet     Follow-up and Dispositions    · Return if symptoms worsen or fail to improve. Pt expressed understanding of visit summary and plans for any follow ups or referrals as well as any medications prescribed.

## 2019-09-23 NOTE — PATIENT INSTRUCTIONS
Urinary Tract Infection in Women: Care Instructions  Your Care Instructions    A urinary tract infection, or UTI, is a general term for an infection anywhere between the kidneys and the urethra (where urine comes out). Most UTIs are bladder infections. They often cause pain or burning when you urinate. UTIs are caused by bacteria and can be cured with antibiotics. Be sure to complete your treatment so that the infection goes away. Follow-up care is a key part of your treatment and safety. Be sure to make and go to all appointments, and call your doctor if you are having problems. It's also a good idea to know your test results and keep a list of the medicines you take. How can you care for yourself at home? · Take your antibiotics as directed. Do not stop taking them just because you feel better. You need to take the full course of antibiotics. · Drink extra water and other fluids for the next day or two. This may help wash out the bacteria that are causing the infection. (If you have kidney, heart, or liver disease and have to limit fluids, talk with your doctor before you increase your fluid intake.)  · Avoid drinks that are carbonated or have caffeine. They can irritate the bladder. · Urinate often. Try to empty your bladder each time. · To relieve pain, take a hot bath or lay a heating pad set on low over your lower belly or genital area. Never go to sleep with a heating pad in place. To prevent UTIs  · Drink plenty of water each day. This helps you urinate often, which clears bacteria from your system. (If you have kidney, heart, or liver disease and have to limit fluids, talk with your doctor before you increase your fluid intake.)  · Urinate when you need to. · Urinate right after you have sex. · Change sanitary pads often. · Avoid douches, bubble baths, feminine hygiene sprays, and other feminine hygiene products that have deodorants.   · After going to the bathroom, wipe from front to back.  When should you call for help? Call your doctor now or seek immediate medical care if:    · Symptoms such as fever, chills, nausea, or vomiting get worse or appear for the first time.     · You have new pain in your back just below your rib cage. This is called flank pain.     · There is new blood or pus in your urine.     · You have any problems with your antibiotic medicine.    Watch closely for changes in your health, and be sure to contact your doctor if:    · You are not getting better after taking an antibiotic for 2 days.     · Your symptoms go away but then come back. Where can you learn more? Go to http://ramírez-jody.info/. Enter M198 in the search box to learn more about \"Urinary Tract Infection in Women: Care Instructions. \"  Current as of: December 19, 2018  Content Version: 12.2  © 3086-3520 [employer]. Care instructions adapted under license by [employer] (which disclaims liability or warranty for this information). If you have questions about a medical condition or this instruction, always ask your healthcare professional. [address] any warranty or liability for your use of this information.

## 2019-10-09 RX ORDER — NIFEDIPINE 90 MG/1
TABLET, FILM COATED, EXTENDED RELEASE ORAL
Qty: 30 TAB | Refills: 0 | Status: SHIPPED | OUTPATIENT
Start: 2019-10-09 | End: 2019-10-29 | Stop reason: SDUPTHER

## 2019-10-09 NOTE — TELEPHONE ENCOUNTER
She needs a follow up visit. BP has not been at goal.   I haven't received any consults from nephrology or endo. Is she going to these specialists? She is due for labs.  I'll give 30 days

## 2019-10-30 RX ORDER — NIFEDIPINE 90 MG/1
TABLET, FILM COATED, EXTENDED RELEASE ORAL
Qty: 30 TAB | Refills: 0 | Status: SHIPPED | OUTPATIENT
Start: 2019-10-30 | End: 2019-12-09 | Stop reason: SDUPTHER

## 2019-11-19 ENCOUNTER — OFFICE VISIT (OUTPATIENT)
Dept: INTERNAL MEDICINE CLINIC | Age: 84
End: 2019-11-19

## 2019-11-19 VITALS
WEIGHT: 119.5 LBS | HEART RATE: 66 BPM | OXYGEN SATURATION: 94 % | TEMPERATURE: 98.2 F | DIASTOLIC BLOOD PRESSURE: 70 MMHG | RESPIRATION RATE: 16 BRPM | SYSTOLIC BLOOD PRESSURE: 221 MMHG | BODY MASS INDEX: 24.09 KG/M2 | HEIGHT: 59 IN

## 2019-11-19 DIAGNOSIS — R06.00 DYSPNEA AND RESPIRATORY ABNORMALITIES: ICD-10-CM

## 2019-11-19 DIAGNOSIS — J45.20 MILD INTERMITTENT ASTHMA WITHOUT COMPLICATION: ICD-10-CM

## 2019-11-19 DIAGNOSIS — R06.89 DYSPNEA AND RESPIRATORY ABNORMALITIES: ICD-10-CM

## 2019-11-19 DIAGNOSIS — I10 ELEVATED SYSTOLIC BLOOD PRESSURE READING WITH DIAGNOSIS OF HYPERTENSION: Primary | ICD-10-CM

## 2019-11-19 RX ORDER — NEBULIZER AND COMPRESSOR
EACH MISCELLANEOUS
Qty: 1 EACH | Refills: 0 | Status: SHIPPED | OUTPATIENT
Start: 2019-11-19

## 2019-11-19 RX ORDER — IPRATROPIUM BROMIDE AND ALBUTEROL SULFATE 2.5; .5 MG/3ML; MG/3ML
3 SOLUTION RESPIRATORY (INHALATION) 4 TIMES DAILY
Qty: 30 NEBULE | Refills: 3 | Status: SHIPPED | OUTPATIENT
Start: 2019-11-19

## 2019-11-19 RX ORDER — IPRATROPIUM BROMIDE 0.5 MG/2.5ML
0.5 SOLUTION RESPIRATORY (INHALATION) AS NEEDED
Qty: 300 ML | Refills: 5 | Status: SHIPPED | OUTPATIENT
Start: 2019-11-19

## 2019-11-19 NOTE — PROGRESS NOTES
Chief Complaint   Patient presents with    Cough     Pts daughter states she is not sure where her mothers nebulizer machine is she needs a new one.    1. Have you been to the ER, urgent care clinic since your last visit? Hospitalized since your last visit? No    2. Have you seen or consulted any other health care providers outside of the [address] [place] [address] since your last visit? Include any pap smears or colon screening.  No

## 2019-11-19 NOTE — PROGRESS NOTES
HISTORY OF PRESENT ILLNESS  Yuliana Zurita is a 80 y.o. female. HPI Ms. Meena Gr is here for c/o cough. She started to not feel well 2 days ago. Her daughter had said she wanted her to go to the ER b/c of her wheezing, but she declined. She has not followed up here or with specialists for regular appointments. Her daughter said she has broken or lost her nebulizer and would like another one. Review of Systems   Constitutional: Positive for fever (reports being feverish 2 days ago) and malaise/fatigue. Respiratory: Positive for cough, shortness of breath and wheezing. Cardiovascular: Negative for chest pain and leg swelling. Gastrointestinal: Positive for nausea. Physical Exam   Constitutional: She is oriented to person, place, and time. She appears well-developed. No distress. She appears to have lost weight and confirmed her weight is down 8-10 lbs   HENT:   Head: Normocephalic and atraumatic. Eyes: Conjunctivae are normal.   Cardiovascular: Normal rate. Pulmonary/Chest: She has wheezes. She has rales. Neurological: She is alert and oriented to person, place, and time. Visit Vitals  BP (!) 221/70 (BP 1 Location: Left arm, BP Patient Position: Sitting)   Pulse 66   Temp 98.2 °F (36.8 °C) (Oral)   Resp 16   Ht 4' 11\" (1.499 m)   Wt 119 lb 8 oz (54.2 kg)   SpO2 94%   BMI 24.14 kg/m²       ASSESSMENT and PLAN    ICD-10-CM ICD-9-CM    1. Elevated systolic blood pressure reading with diagnosis of hypertension I10 401.9    2. Mild intermittent asthma without complication P94.55 250.33 Nebulizer & Compressor machine      albuterol-ipratropium (DUO-NEB) 2.5 mg-0.5 mg/3 ml nebu      ipratropium (ATROVENT) 0.02 % soln   3. Dyspnea and respiratory abnormalities R06.00 786.09     R06.89     Discussed with Ms. Gonzales and her daughter that with her systolic BP being so elevated and wheezing and shortness of breath, she needs to go to the ER. They agreed and will go.    .Pt verbalized understanding of their condition and diagnoses, treatment plan,  as well as side effects of any new medications prescribed.

## 2019-12-09 ENCOUNTER — OFFICE VISIT (OUTPATIENT)
Dept: INTERNAL MEDICINE CLINIC | Age: 84
End: 2019-12-09

## 2019-12-09 VITALS
OXYGEN SATURATION: 97 % | SYSTOLIC BLOOD PRESSURE: 135 MMHG | HEART RATE: 62 BPM | RESPIRATION RATE: 16 BRPM | HEIGHT: 59 IN | DIASTOLIC BLOOD PRESSURE: 61 MMHG | WEIGHT: 121 LBS | BODY MASS INDEX: 24.39 KG/M2 | TEMPERATURE: 98.6 F

## 2019-12-09 DIAGNOSIS — I10 ESSENTIAL HYPERTENSION, BENIGN: ICD-10-CM

## 2019-12-09 DIAGNOSIS — K21.9 GASTROESOPHAGEAL REFLUX DISEASE WITHOUT ESOPHAGITIS: ICD-10-CM

## 2019-12-09 DIAGNOSIS — G47.00 INSOMNIA, UNSPECIFIED TYPE: ICD-10-CM

## 2019-12-09 RX ORDER — FUROSEMIDE 40 MG/1
TABLET ORAL
Qty: 90 TAB | Refills: 1 | Status: SHIPPED | OUTPATIENT
Start: 2019-12-09

## 2019-12-09 RX ORDER — HYDRALAZINE HYDROCHLORIDE 100 MG/1
TABLET, FILM COATED ORAL
Qty: 270 TAB | Refills: 1 | Status: SHIPPED | OUTPATIENT
Start: 2019-12-09

## 2019-12-09 RX ORDER — OMEPRAZOLE 40 MG/1
40 CAPSULE, DELAYED RELEASE ORAL DAILY
Qty: 90 CAP | Refills: 1 | Status: SHIPPED | OUTPATIENT
Start: 2019-12-09

## 2019-12-09 RX ORDER — ISOSORBIDE MONONITRATE 30 MG/1
TABLET, EXTENDED RELEASE ORAL
Qty: 90 TAB | Refills: 1 | Status: SHIPPED | OUTPATIENT
Start: 2019-12-09

## 2019-12-09 RX ORDER — POTASSIUM CHLORIDE 20 MEQ/1
20 TABLET, EXTENDED RELEASE ORAL DAILY
Qty: 90 TAB | Refills: 1 | Status: SHIPPED | OUTPATIENT
Start: 2019-12-09

## 2019-12-09 RX ORDER — RAMELTEON 8 MG/1
8 TABLET ORAL
Qty: 90 TAB | Refills: 1 | Status: SHIPPED | OUTPATIENT
Start: 2019-12-09

## 2019-12-09 RX ORDER — NIFEDIPINE 90 MG/1
90 TABLET, FILM COATED, EXTENDED RELEASE ORAL DAILY
Qty: 90 TAB | Refills: 1 | Status: SHIPPED | OUTPATIENT
Start: 2019-12-09

## 2019-12-09 RX ORDER — LANOLIN ALCOHOL/MO/W.PET/CERES
CREAM (GRAM) TOPICAL
Qty: 90 TAB | Refills: 1 | Status: SHIPPED | OUTPATIENT
Start: 2019-12-09

## 2019-12-09 RX ORDER — CARVEDILOL 12.5 MG/1
12.5 TABLET ORAL 2 TIMES DAILY
Qty: 180 TAB | Refills: 1 | Status: SHIPPED | OUTPATIENT
Start: 2019-12-09

## 2019-12-09 NOTE — PROGRESS NOTES
HISTORY OF PRESENT ILLNESS  Bree Hernandez is a 80 y.o. female. HPI  Ms. Jaqueline Zaman is here to follow up after recent hospitalization    Hospital Course   Chief Complaint/History of Present Illness:   My blood pressure was very high     80year-old female with history of hypertension, hyperlipidemia, diabetes, reflux disease, chronic kidney disease, congestive heart failure who presents to the emergency room today with uncontrolled blood pressure cough cold congestion shortness of breath and wheezing. Patient denies smoking denies any sick contacts. She denies getting her flu shots. . Patient mentions she has been out of 1 of her medication and her blood pressures been very high she had some headache with no dizziness no chest pain but has been having cough congestion and worsening wheezing over the last few days despite taking her medications. She denies any chest tightness denies any new tingling numbness weakness anywhere in the body. She mentions her daughter takes care of her medications but due to worsening blood pressures, and worsening wheezing she was brought to the emergency room today.     Problems Managed During Hospitalization:  27-year-old female presents to the emergency room with uncontrolled hypertension cough cold congestion ongoing for the last few days:       Acute exacerbation of asthma with acute bronchitis  We will continue oxygen, nebulizers, cough medication and steroids   Denies smoking  Denies being on home oxygen  Breathing much better         Chronic D CHF  - DGW 3644 whic is slightly higher than her baseline but does not appear to be in vol overload   In the setting of chronic kidney disease  - cxr with nothing acute  Last echo from June 2019 with 69% EF and stage I diastolic dysfunction        Hypertensive urgency  Blood pressures in 263N with diastolic 348  Mentions she has been out of 1 of her medications  Blood pressures better at this time  We will avoid aggressive correction  BP stable    Chronic kidney disease stage IV  -Sees Dr. Meenakshi Barrientos as an outpatient  Cr 2.3         Mild hypokalemia  Will replace potassium   K 3.7        DM2 on insulin -- uncontrolled possibly sec to steroids   a1c 5.9  S/p subcu Glucomander  Continue lantus         GERD  - cont prilosec        Diabetic Nephropathy  - cont gabapentin     Anemia of chronic disease - of CKD  - stable, cont to trend     Code Status: full code      Condition and findings and plan reviewed with pt and Rn and care mnt     Overall stable at this time for DC    Current Outpatient Medications   Medication Sig    NIFEdipine ER (ADALAT CC) 90 mg ER tablet Take 1 Tab by mouth daily.  isosorbide mononitrate ER (IMDUR) 30 mg tablet take 1 tablet by mouth once daily    ferrous sulfate 325 mg (65 mg iron) tablet take 1 tablet by mouth once daily before breakfast    ramelteon (ROZEREM) 8 mg tablet Take 1 Tab by mouth nightly.  omeprazole (PRILOSEC) 40 mg capsule Take 1 Cap by mouth daily.  carvedilol (COREG) 12.5 mg tablet Take 1 Tab by mouth two (2) times a day.  furosemide (LASIX) 40 mg tablet take 1/2 tablet every morning and 1/2 tablet IN THE AFTERNOON    potassium chloride (K-DUR, KLOR-CON) 20 mEq tablet Take 1 Tab by mouth daily.  hydrALAZINE (APRESOLINE) 100 mg tablet take 1 tablet by mouth three times a day    Nebulizer & Compressor machine Use when needed for wheezing    albuterol-ipratropium (DUO-NEB) 2.5 mg-0.5 mg/3 ml nebu 3 mL by Nebulization route four (4) times daily.  ipratropium (ATROVENT) 0.02 % soln 2.5 mL by Nebulization route as needed (wheezing).  polyethylene glycol (LAXACLEAR) 17 gram/dose powder Take 17 g by mouth daily.  gabapentin (NEURONTIN) 100 mg capsule take 1 capsule at bedtime    COL-RITE 100 mg capsule take 1 capsule by mouth daily    fluticasone-salmeterol (ADVAIR DISKUS) 500-50 mcg/dose diskus inhaler Take 1 Puff by inhalation every twelve (12) hours.     cloNIDine (CATAPRES) 0.2 mg/24 hr patch apply 1 patch every week    Back Brace misc Use for chronic back pain    acetaminophen (TYLENOL) 500 mg tablet Take  by mouth every six (6) hours as needed for Pain.  insulin glargine (LANTUS SOLOSTAR) 100 unit/mL (3 mL) pen 4 Units by SubCUTAneous route daily.  Insulin Needles, Disposable, (BD INSULIN PEN NEEDLE UF SHORT) 31 gauge x 5/16\" ndle Use for bid insulin injections    Insulin Syringes, Disposable, 1 mL syrg Use for bid insulin administration    ONETOUCH ULTRA TEST strip     multivitamin (ONE A DAY) tablet Take 1 Tab by mouth daily.  Walker misc Pneumonia, weakness    aspirin delayed-release 81 mg tablet Take  by mouth daily. No current facility-administered medications for this visit. Review of Systems   Constitutional: Negative for malaise/fatigue (improving). Eyes: Negative. Respiratory: Negative for cough, shortness of breath (improving) and wheezing (improving). Gastrointestinal: Positive for heartburn (GERD). Neurological: Negative for dizziness and headaches. Psychiatric/Behavioral: The patient has insomnia. Physical Exam  Constitutional:       Appearance: Normal appearance. She is obese. HENT:      Head: Normocephalic and atraumatic. Neck:      Musculoskeletal: Normal range of motion and neck supple. Cardiovascular:      Rate and Rhythm: Normal rate and regular rhythm. Pulses: Normal pulses. Heart sounds: Normal heart sounds. Pulmonary:      Effort: Pulmonary effort is normal.      Breath sounds: Normal breath sounds. Neurological:      General: No focal deficit present. Mental Status: She is alert and oriented to person, place, and time. Psychiatric:         Mood and Affect: Mood normal.         Thought Content:  Thought content normal.         Judgment: Judgment normal.       Visit Vitals  /61 (BP 1 Location: Left arm, BP Patient Position: Sitting)   Pulse 62   Temp 98.6 °F (37 °C) (Oral)   Resp 16   Ht 4' 11\" (1.499 m)   Wt 121 lb (54.9 kg)   SpO2 97%   BMI 24.44 kg/m²     Wt Readings from Last 3 Encounters:   12/09/19 121 lb (54.9 kg)   11/19/19 119 lb 8 oz (54.2 kg)   09/12/19 127 lb (57.6 kg)       ASSESSMENT and PLAN    ICD-10-CM ICD-9-CM    1. Essential hypertension, benign I10 401.1 isosorbide mononitrate ER (IMDUR) 30 mg tablet   2. Insomnia, unspecified type G47.00 780.52 ramelteon (ROZEREM) 8 mg tablet   3. Gastroesophageal reflux disease without esophagitis K21.9 530.81 omeprazole (PRILOSEC) 40 mg capsule     Pt verbalized understanding of their condition and diagnoses, treatment plan,  as well as side effects of any new medications prescribed. Patient Instructions   Please follow up with nephrology, cardiology and endocrinology.        [facility]  Dr. Ta Vincent  [facility], [address] [facility], [address] [place] [address]  Phone: [phone]  Fax: [phone]

## 2019-12-09 NOTE — PROGRESS NOTES
Chief Complaint   Patient presents with   [facility] Follow Up    Hypertension    Cholesterol Problem    Sleep Problem     1. Have you been to the ER, urgent care clinic since your last visit? Hospitalized since your last visit? Yes Where: [facility]    2. Have you seen or consulted any other health care providers outside of the [address] [place] [address] since your last visit? Include any pap smears or colon screening.  No

## 2019-12-09 NOTE — PATIENT INSTRUCTIONS
Please follow up with nephrology, cardiology and endocrinology.        [facility]  Dr. Andrea Morales  [facility], [address] [place] [address]  Phone: [phone]  Fax: [phone]

## 2019-12-18 ENCOUNTER — TELEPHONE (OUTPATIENT)
Dept: INTERNAL MEDICINE CLINIC | Age: 84
End: 2019-12-18

## 2019-12-18 NOTE — TELEPHONE ENCOUNTER
Pt daughter called asking about status of nebulizer for her mom. Says company has been trying to get in contact with this office for information.  She says Lashay Esposito has been handling it in the past.

## 2020-09-28 ENCOUNTER — PATIENT OUTREACH (OUTPATIENT)
Dept: CASE MANAGEMENT | Age: 85
End: 2020-09-28

## 2020-09-28 NOTE — PROGRESS NOTES
9/28/2020 3:47 PM  Call placed to patient at only number. Patient's daughter is her emergency contact and has the same numbers. Attempted to reach patient for hospital follow up. No answer and there was no way to leave a message because no voice mail set up.

## 2020-10-02 ENCOUNTER — PATIENT OUTREACH (OUTPATIENT)
Dept: CASE MANAGEMENT | Age: 85
End: 2020-10-02

## 2020-10-02 NOTE — PROGRESS NOTES
10/2/2020 2:45 PM  Call placed to patient at only number. Patient's daughter is her emergency contact and has the same numbers. Attempted to reach patient for hospital follow up. No answer and there was no way to leave a message because no voice mail set up.

## 2022-03-19 PROBLEM — E11.40 TYPE 2 DIABETES MELLITUS WITH DIABETIC NEUROPATHY (HCC): Status: ACTIVE | Noted: 2018-03-29

## 2023-05-26 RX ORDER — HYDRALAZINE HYDROCHLORIDE 100 MG/1
1 TABLET, FILM COATED ORAL 3 TIMES DAILY
COMMUNITY
Start: 2019-12-09

## 2023-05-26 RX ORDER — ASPIRIN 81 MG/1
TABLET ORAL DAILY
COMMUNITY

## 2023-05-26 RX ORDER — POTASSIUM CHLORIDE 20 MEQ/1
20 TABLET, EXTENDED RELEASE ORAL DAILY
COMMUNITY
Start: 2019-12-09

## 2023-05-26 RX ORDER — GABAPENTIN 100 MG/1
CAPSULE ORAL
COMMUNITY
Start: 2019-05-30

## 2023-05-26 RX ORDER — POLYETHYLENE GLYCOL 3350 17 G/17G
17 POWDER, FOR SOLUTION ORAL DAILY
COMMUNITY
Start: 2019-05-30

## 2023-05-26 RX ORDER — NIFEDIPINE 90 MG/1
90 TABLET, FILM COATED, EXTENDED RELEASE ORAL DAILY
COMMUNITY
Start: 2019-12-09

## 2023-05-26 RX ORDER — INSULIN GLARGINE 100 [IU]/ML
4 INJECTION, SOLUTION SUBCUTANEOUS DAILY
COMMUNITY

## 2023-05-26 RX ORDER — CARVEDILOL 12.5 MG/1
12.5 TABLET ORAL 2 TIMES DAILY
COMMUNITY
Start: 2019-12-09

## 2023-05-26 RX ORDER — RAMELTEON 8 MG/1
8 TABLET ORAL
COMMUNITY
Start: 2019-12-09

## 2023-05-26 RX ORDER — CLONIDINE 0.2 MG/24H
PATCH, EXTENDED RELEASE TRANSDERMAL
COMMUNITY
Start: 2018-10-22

## 2023-05-26 RX ORDER — FLUTICASONE PROPIONATE AND SALMETEROL 500; 50 UG/1; UG/1
1 POWDER RESPIRATORY (INHALATION) EVERY 12 HOURS
COMMUNITY

## 2023-05-26 RX ORDER — PSEUDOEPHEDRINE HCL 30 MG
1 TABLET ORAL DAILY
COMMUNITY
Start: 2019-05-30

## 2023-05-26 RX ORDER — ISOSORBIDE MONONITRATE 30 MG/1
1 TABLET, EXTENDED RELEASE ORAL DAILY
COMMUNITY
Start: 2019-12-09

## 2023-05-26 RX ORDER — ACETAMINOPHEN 500 MG
TABLET ORAL EVERY 6 HOURS PRN
COMMUNITY

## 2023-05-26 RX ORDER — FUROSEMIDE 40 MG/1
TABLET ORAL
COMMUNITY
Start: 2019-12-09

## 2023-05-26 RX ORDER — FERROUS SULFATE 325(65) MG
TABLET ORAL
COMMUNITY
Start: 2019-12-09

## 2023-05-26 RX ORDER — IPRATROPIUM BROMIDE AND ALBUTEROL SULFATE 2.5; .5 MG/3ML; MG/3ML
3 SOLUTION RESPIRATORY (INHALATION) 4 TIMES DAILY
COMMUNITY
Start: 2019-11-19

## 2023-05-26 RX ORDER — OMEPRAZOLE 40 MG/1
40 CAPSULE, DELAYED RELEASE ORAL DAILY
COMMUNITY
Start: 2019-12-09